# Patient Record
Sex: MALE | Race: WHITE | NOT HISPANIC OR LATINO | Employment: FULL TIME | URBAN - METROPOLITAN AREA
[De-identification: names, ages, dates, MRNs, and addresses within clinical notes are randomized per-mention and may not be internally consistent; named-entity substitution may affect disease eponyms.]

---

## 2017-01-05 ENCOUNTER — ALLSCRIPTS OFFICE VISIT (OUTPATIENT)
Dept: OTHER | Facility: OTHER | Age: 37
End: 2017-01-05

## 2017-01-05 DIAGNOSIS — N18.2 CHRONIC KIDNEY DISEASE, STAGE II (MILD): ICD-10-CM

## 2017-01-05 DIAGNOSIS — Z94.0 HISTORY OF KIDNEY TRANSPLANT: ICD-10-CM

## 2017-01-05 DIAGNOSIS — N39.0 URINARY TRACT INFECTION: ICD-10-CM

## 2017-01-05 DIAGNOSIS — I10 ESSENTIAL (PRIMARY) HYPERTENSION: ICD-10-CM

## 2017-01-05 DIAGNOSIS — R07.81 PLEURODYNIA: ICD-10-CM

## 2017-01-17 ENCOUNTER — ALLSCRIPTS OFFICE VISIT (OUTPATIENT)
Dept: OTHER | Facility: OTHER | Age: 37
End: 2017-01-17

## 2017-01-17 ENCOUNTER — GENERIC CONVERSION - ENCOUNTER (OUTPATIENT)
Dept: OTHER | Facility: OTHER | Age: 37
End: 2017-01-17

## 2017-01-21 ENCOUNTER — OFFICE VISIT (OUTPATIENT)
Dept: URGENT CARE | Age: 37
End: 2017-01-21
Payer: COMMERCIAL

## 2017-01-21 ENCOUNTER — HOSPITAL ENCOUNTER (OUTPATIENT)
Dept: RADIOLOGY | Age: 37
Discharge: HOME | End: 2017-01-21
Attending: PHYSICIAN ASSISTANT | Admitting: FAMILY MEDICINE
Payer: COMMERCIAL

## 2017-01-21 ENCOUNTER — TRANSCRIBE ORDERS (OUTPATIENT)
Dept: URGENT CARE | Age: 37
End: 2017-01-21

## 2017-01-21 DIAGNOSIS — R07.81 PLEURODYNIA: ICD-10-CM

## 2017-01-21 PROCEDURE — 71101 X-RAY EXAM UNILAT RIBS/CHEST: CPT

## 2017-01-21 PROCEDURE — G0382 LEV 3 HOSP TYPE B ED VISIT: HCPCS | Performed by: FAMILY MEDICINE

## 2017-01-21 PROCEDURE — 99283 EMERGENCY DEPT VISIT LOW MDM: CPT | Performed by: FAMILY MEDICINE

## 2017-01-23 ENCOUNTER — ALLSCRIPTS OFFICE VISIT (OUTPATIENT)
Dept: OTHER | Facility: OTHER | Age: 37
End: 2017-01-23

## 2017-01-23 LAB
BILIRUB UR QL STRIP: NORMAL
CLARITY UR: NORMAL
COLOR UR: NORMAL
GLUCOSE (HISTORICAL): NORMAL
HGB UR QL STRIP.AUTO: NORMAL
KETONES UR STRIP-MCNC: NORMAL MG/DL
LEUKOCYTE ESTERASE UR QL STRIP: NORMAL
NITRITE UR QL STRIP: NORMAL
PH UR STRIP.AUTO: 5 [PH]
PROT UR STRIP-MCNC: NORMAL MG/DL
SP GR UR STRIP.AUTO: 1.02
UROBILINOGEN UR QL STRIP.AUTO: 0.2

## 2017-01-24 ENCOUNTER — TRANSCRIBE ORDERS (OUTPATIENT)
Dept: ADMINISTRATIVE | Age: 37
End: 2017-01-24

## 2017-01-24 ENCOUNTER — APPOINTMENT (OUTPATIENT)
Dept: LAB | Age: 37
End: 2017-01-24
Payer: COMMERCIAL

## 2017-01-24 ENCOUNTER — HOSPITAL ENCOUNTER (OUTPATIENT)
Dept: RADIOLOGY | Age: 37
Discharge: HOME | End: 2017-01-24
Payer: COMMERCIAL

## 2017-01-24 DIAGNOSIS — N39.0 URINARY TRACT INFECTION: ICD-10-CM

## 2017-01-24 DIAGNOSIS — N18.2 CHRONIC KIDNEY DISEASE, STAGE II (MILD): Primary | ICD-10-CM

## 2017-01-24 DIAGNOSIS — N18.2 CHRONIC KIDNEY DISEASE, STAGE II (MILD): ICD-10-CM

## 2017-01-24 DIAGNOSIS — Z94.0 HISTORY OF KIDNEY TRANSPLANT: ICD-10-CM

## 2017-01-24 DIAGNOSIS — I10 ESSENTIAL (PRIMARY) HYPERTENSION: ICD-10-CM

## 2017-01-24 LAB
ANION GAP SERPL CALCULATED.3IONS-SCNC: 7 MMOL/L (ref 4–13)
BACTERIA UR QL AUTO: ABNORMAL /HPF
BILIRUB UR QL STRIP: NEGATIVE
BUN SERPL-MCNC: 21 MG/DL (ref 5–25)
C3 SERPL-MCNC: 85.2 MG/DL (ref 90–180)
C4 SERPL-MCNC: 30 MG/DL (ref 10–40)
CALCIUM SERPL-MCNC: 9 MG/DL (ref 8.3–10.1)
CHLORIDE SERPL-SCNC: 104 MMOL/L (ref 100–108)
CLARITY UR: CLEAR
CO2 SERPL-SCNC: 25 MMOL/L (ref 21–32)
COLOR UR: YELLOW
CREAT SERPL-MCNC: 1.53 MG/DL (ref 0.6–1.3)
ERYTHROCYTE [DISTWIDTH] IN BLOOD BY AUTOMATED COUNT: 14.3 % (ref 11.6–15.1)
GFR SERPL CREATININE-BSD FRML MDRD: 51.5 ML/MIN/1.73SQ M
GLUCOSE SERPL-MCNC: 101 MG/DL (ref 65–140)
GLUCOSE UR STRIP-MCNC: NEGATIVE MG/DL
HCT VFR BLD AUTO: 39.8 % (ref 36.5–49.3)
HGB BLD-MCNC: 13.5 G/DL (ref 12–17)
HGB UR QL STRIP.AUTO: ABNORMAL
HYALINE CASTS #/AREA URNS LPF: ABNORMAL /LPF
KETONES UR STRIP-MCNC: NEGATIVE MG/DL
LEUKOCYTE ESTERASE UR QL STRIP: NEGATIVE
MCH RBC QN AUTO: 28.8 PG (ref 26.8–34.3)
MCHC RBC AUTO-ENTMCNC: 33.9 G/DL (ref 31.4–37.4)
MCV RBC AUTO: 85 FL (ref 82–98)
NITRITE UR QL STRIP: NEGATIVE
NON-SQ EPI CELLS URNS QL MICRO: ABNORMAL /HPF
PH UR STRIP.AUTO: 6 [PH] (ref 4.5–8)
PLATELET # BLD AUTO: 288 THOUSANDS/UL (ref 149–390)
PMV BLD AUTO: 10.3 FL (ref 8.9–12.7)
POTASSIUM SERPL-SCNC: 4.1 MMOL/L (ref 3.5–5.3)
PROT UR STRIP-MCNC: ABNORMAL MG/DL
RBC # BLD AUTO: 4.69 MILLION/UL (ref 3.88–5.62)
RBC #/AREA URNS AUTO: ABNORMAL /HPF
SODIUM SERPL-SCNC: 136 MMOL/L (ref 136–145)
SP GR UR STRIP.AUTO: 1.01 (ref 1–1.03)
UROBILINOGEN UR QL STRIP.AUTO: 0.2 E.U./DL
WBC # BLD AUTO: 7.08 THOUSAND/UL (ref 4.31–10.16)
WBC #/AREA URNS AUTO: ABNORMAL /HPF

## 2017-01-24 PROCEDURE — 80197 ASSAY OF TACROLIMUS: CPT

## 2017-01-24 PROCEDURE — 86038 ANTINUCLEAR ANTIBODIES: CPT

## 2017-01-24 PROCEDURE — 80048 BASIC METABOLIC PNL TOTAL CA: CPT

## 2017-01-24 PROCEDURE — 85027 COMPLETE CBC AUTOMATED: CPT

## 2017-01-24 PROCEDURE — 86255 FLUORESCENT ANTIBODY SCREEN: CPT

## 2017-01-24 PROCEDURE — 87086 URINE CULTURE/COLONY COUNT: CPT

## 2017-01-24 PROCEDURE — 81001 URINALYSIS AUTO W/SCOPE: CPT

## 2017-01-24 PROCEDURE — 86160 COMPLEMENT ANTIGEN: CPT

## 2017-01-24 PROCEDURE — 36415 COLL VENOUS BLD VENIPUNCTURE: CPT

## 2017-01-25 ENCOUNTER — HOSPITAL ENCOUNTER (OUTPATIENT)
Dept: NON INVASIVE DIAGNOSTICS | Facility: CLINIC | Age: 37
Discharge: HOME | End: 2017-01-25
Payer: COMMERCIAL

## 2017-01-25 ENCOUNTER — GENERIC CONVERSION - ENCOUNTER (OUTPATIENT)
Dept: OTHER | Facility: OTHER | Age: 37
End: 2017-01-25

## 2017-01-25 DIAGNOSIS — N18.2 CHRONIC KIDNEY DISEASE, STAGE II (MILD): ICD-10-CM

## 2017-01-25 LAB
BACTERIA UR CULT: NORMAL
RYE IGE QN: NEGATIVE

## 2017-01-25 PROCEDURE — 93975 VASCULAR STUDY: CPT

## 2017-01-26 LAB
C-ANCA TITR SER IF: NORMAL TITER
MYELOPEROXIDASE AB SER IA-ACNC: <9 U/ML (ref 0–9)
P-ANCA ATYPICAL TITR SER IF: NORMAL TITER
P-ANCA TITR SER IF: NORMAL TITER
PROTEINASE3 AB SER IA-ACNC: <3.5 U/ML (ref 0–3.5)
TACROLIMUS BLD LC/MS/MS-MCNC: 8.5 NG/ML (ref 2–20)

## 2017-02-03 ENCOUNTER — GENERIC CONVERSION - ENCOUNTER (OUTPATIENT)
Dept: OTHER | Facility: OTHER | Age: 37
End: 2017-02-03

## 2017-02-06 ENCOUNTER — GENERIC CONVERSION - ENCOUNTER (OUTPATIENT)
Dept: OTHER | Facility: OTHER | Age: 37
End: 2017-02-06

## 2017-03-02 ENCOUNTER — GENERIC CONVERSION - ENCOUNTER (OUTPATIENT)
Dept: OTHER | Facility: OTHER | Age: 37
End: 2017-03-02

## 2017-03-03 ENCOUNTER — GENERIC CONVERSION - ENCOUNTER (OUTPATIENT)
Dept: OTHER | Facility: OTHER | Age: 37
End: 2017-03-03

## 2017-03-05 ENCOUNTER — GENERIC CONVERSION - ENCOUNTER (OUTPATIENT)
Dept: OTHER | Facility: OTHER | Age: 37
End: 2017-03-05

## 2017-03-12 ENCOUNTER — GENERIC CONVERSION - ENCOUNTER (OUTPATIENT)
Dept: OTHER | Facility: OTHER | Age: 37
End: 2017-03-12

## 2017-03-20 ENCOUNTER — GENERIC CONVERSION - ENCOUNTER (OUTPATIENT)
Dept: OTHER | Facility: OTHER | Age: 37
End: 2017-03-20

## 2017-03-30 ENCOUNTER — TRANSCRIBE ORDERS (OUTPATIENT)
Dept: ADMINISTRATIVE | Facility: HOSPITAL | Age: 37
End: 2017-03-30

## 2017-03-30 DIAGNOSIS — N04.5 NEPHROTIC SYNDROME WITH LESION OF MEMBRANOPROLIFERATIVE GLOMERULONEPHRITIS: ICD-10-CM

## 2017-03-30 DIAGNOSIS — N05.6 DENSE DEPOSIT DISEASE: Primary | ICD-10-CM

## 2017-03-30 DIAGNOSIS — Z94.0 KIDNEY REPLACED BY TRANSPLANT: ICD-10-CM

## 2017-03-30 DIAGNOSIS — R80.9 PROTEINURIA: ICD-10-CM

## 2017-04-01 DIAGNOSIS — Z94.0 HISTORY OF KIDNEY TRANSPLANT: ICD-10-CM

## 2017-04-03 ENCOUNTER — GENERIC CONVERSION - ENCOUNTER (OUTPATIENT)
Dept: OTHER | Facility: OTHER | Age: 37
End: 2017-04-03

## 2017-05-09 ENCOUNTER — GENERIC CONVERSION - ENCOUNTER (OUTPATIENT)
Dept: OTHER | Facility: OTHER | Age: 37
End: 2017-05-09

## 2017-05-26 ENCOUNTER — ALLSCRIPTS OFFICE VISIT (OUTPATIENT)
Dept: OTHER | Facility: OTHER | Age: 37
End: 2017-05-26

## 2017-07-18 ENCOUNTER — GENERIC CONVERSION - ENCOUNTER (OUTPATIENT)
Dept: OTHER | Facility: OTHER | Age: 37
End: 2017-07-18

## 2017-08-17 ENCOUNTER — ALLSCRIPTS OFFICE VISIT (OUTPATIENT)
Dept: OTHER | Facility: OTHER | Age: 37
End: 2017-08-17

## 2017-09-01 DIAGNOSIS — T86.19 OTHER COMPLICATION OF KIDNEY TRANSPLANT: ICD-10-CM

## 2017-09-01 DIAGNOSIS — E55.9 VITAMIN D DEFICIENCY: ICD-10-CM

## 2017-11-17 ENCOUNTER — ALLSCRIPTS OFFICE VISIT (OUTPATIENT)
Dept: OTHER | Facility: OTHER | Age: 37
End: 2017-11-17

## 2017-12-14 ENCOUNTER — GENERIC CONVERSION - ENCOUNTER (OUTPATIENT)
Dept: OTHER | Facility: OTHER | Age: 37
End: 2017-12-14

## 2017-12-19 ENCOUNTER — GENERIC CONVERSION - ENCOUNTER (OUTPATIENT)
Dept: INTERNAL MEDICINE CLINIC | Facility: CLINIC | Age: 37
End: 2017-12-19

## 2018-01-02 ENCOUNTER — GENERIC CONVERSION - ENCOUNTER (OUTPATIENT)
Dept: INTERNAL MEDICINE CLINIC | Facility: CLINIC | Age: 38
End: 2018-01-02

## 2018-01-02 ENCOUNTER — GENERIC CONVERSION - ENCOUNTER (OUTPATIENT)
Dept: NEPHROLOGY | Facility: CLINIC | Age: 38
End: 2018-01-02

## 2018-01-09 NOTE — MISCELLANEOUS
Message  Pt called stating that his pressure has been elevated (160's / 100) and asked if he can take an extra 300 mg of Labetalol ,per Dr Felix piña to do so just monitor bp's and watch for any dizziness or lightheadedness,pt aware of the above  Active Problems    1  Benign essential hypertension (401 1) (I10)   2  Chronic arthralgias of knees and hips (719 45,719 46)   (M25 551,M25 552,M25 561,M25 562,G89 29)   3  Chronic kidney disease (CKD), stage II (mild) (585 2) (N18 2)   4  Contusion of right chest wall, initial encounter (922 1) (S20 211A)   5  Fever, intermittent (780 60) (R50 9)   6  Herniated lumbar intervertebral disc (722 10) (M51 26)   7  Hyperlipidemia (272 4) (E78 5)   8  Hypomagnesemia (275 2) (E83 42)   9  Hypotension (458 9) (I95 9)   10  Lower back pain (724 2) (M54 5)   11  Microscopic hematuria (599 72) (R31 29)   12  Proteinuria (791 0) (R80 9)   13  Rib pain on right side (786 50) (R07 81)   14  Right buttock pain (729 1) (M79 1)   15  Sciatica, right (724 3) (M54 31)   16  Screening for malignant neoplasm of skin (V76 43) (Z12 83)   17  Status post renal autotransplantation (V42 0) (Z94 0)   18  Urinary tract infection (599 0) (N39 0)   19  Vitamin D deficiency (268 9) (E55 9)    Current Meds   1  Ketorolac Tromethamine 60 MG/2ML Injection Solution; INJECT 60  MG Intramuscular; To Be Done: 72ZWS4865; Status: HOLD FOR - Administration Ordered   2  Labetalol HCl - 300 MG Oral Tablet; take 1 tablet by mouth twice a day; Therapy: 33Uan8484 to (Evaluate:73Xll2253)  Requested for: 26STX6239; Last   Rx:31Mar2016 Ordered   3  Multi Vitamin/Fluoride 1 MG CHEW; CHEW AND SWALLOW 1 TABLET DAILY; Therapy: 02ZOR1193 to (Andre Jama)  Requested for: 22DIR9199; Last   Rx:09Jan2014 Ordered   4  Myfortic 180 MG Oral Tablet Delayed Release (Mycophenolate Sodium); take 3 tablets   bid  Requested for: 25Apr2016; Last Rx:22Apr2016 Ordered   5   NIFEdipine ER Osmotic Release 90 MG Oral Tablet Extended Release 24 Hour; take 1   tablet by mouth once daily; Therapy: 81IXV0878 to (Evaluate:53Irp4320)  Requested for: 88WAK5760; Last   Rx:19Xiv1791 Ordered   6  Omeprazole 20 MG Oral Capsule Delayed Release; take 1 capsule by mouth once daily; Therapy: 54FGQ3084 to (Evaluate:12Hlm2280)  Requested for: 45Fkk4803; Last   Rx:10Doe2198 Ordered   7  Prograf 1 MG Oral Capsule (Tacrolimus); take 3 capsules by mouth twice a day; Therapy: 52LRQ3677 to (Evaluate:02Jun2017)  Requested for: 80OHM2698; Last   Rx:95Dyf5033 Ordered   8  Vitamin D3 2000 UNIT Oral Tablet; Takes 1 daily; Therapy: (Recorded:30Oct2014) to Recorded    Allergies    1   No Known Drug Allergies    Plan  Benign essential hypertension    · Labetalol HCl - 300 MG Oral Tablet; TAKE 1 TABLET 3 times daily    Signatures   Electronically signed by : Clau Peterson, ; Feb  3 2017 11:21AM EST                       (Author)
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
## 2018-01-11 NOTE — MISCELLANEOUS
Message  I called the patient this am and left a message re: I needed to review the labs with the patient  His Cr is 1 6 but I am concerned with regards to increasing proteinuria  I made a tentative jaya for tomorrow in our office with a transplant physician in our practice as this patient  I will try and call the patient later   I left my cell phone number when I spoke left message with patient this am       Signatures   Electronically signed by : Triston Eastman DO; Jan 16 2017  9:17AM EST                       (Author)    Electronically signed by : TERE Haider Mt ; Feb 5 2017  3:48PM EST                       (Author)

## 2018-01-11 NOTE — MISCELLANEOUS
Provider Comments  Provider Comments:   Pt was a n/s  LMOM to call back and make new apt        Signatures   Electronically signed by : TERE Chong ; Apr  3 2017  2:50PM EST                       (Review)

## 2018-01-12 VITALS
DIASTOLIC BLOOD PRESSURE: 88 MMHG | HEIGHT: 72 IN | BODY MASS INDEX: 28.58 KG/M2 | WEIGHT: 211 LBS | SYSTOLIC BLOOD PRESSURE: 132 MMHG

## 2018-01-12 NOTE — MISCELLANEOUS
Message  called patient to review updates from Danisha Ocampo if any  no answer        Signatures   Electronically signed by : TERE Wilkins ; Apr 12 2017  2:47PM EST                       (Author)

## 2018-01-12 NOTE — MISCELLANEOUS
Message  I called the [place] yesterday in followup; [place] of [place] issues  He will fly back as soon as possible but he is not sure when he would be able to catch next flight  We tried to facilitate followup as fast as possible here  He is scheduled to see Dr Mickey Medina in out office on Monday as he is a transplant certified doctor in our practice  I will speak with him as well  Repeat lab work next week        Signatures   Electronically signed by : Leticia Monterroso DO; Jan 17 2017 11:29PM EST                       (Author)
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
## 2018-01-13 VITALS — SYSTOLIC BLOOD PRESSURE: 155 MMHG | DIASTOLIC BLOOD PRESSURE: 100 MMHG

## 2018-01-13 NOTE — MISCELLANEOUS
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, but EM is pending on pathology  Patient also had repeat labwork on Friday that is pending  Is being sent for C3, C4, UPCR, SPEP, UPEP, light chains per [facility] team  Patient also has been started on losartan  - i discussed with patient now that having appointment tomorrow may not benefit patient as we are awaiting further results from [place] and patent has not yet started new medication (losartan); we had increased labetalol to TID dosing last week because of elevated BP > 746 systolic  - for now, will hold appt tomorrow  - will discuss with Primary Nephrologist regarding findings thus far  - will await further results from [facility] which should hopefully return today or tomorrow  - Team will call Pharmacy to determine why losartan is not being released to patient (holding lisinopril)  - patient may need further eval with C3 nephritic factor, sMAC, family screening  - patient will require meningococcal vaccine (Inactived) prior to consideration for eculizumab at least 2 weeks prior  - will need BMP in 2 weeks after starting losartan  - Will cc Dr Timothy Rivera to this note to also make aware, but will also call and discuss     - patient will need f/u appt      Signatures   Electronically signed by : TERE Pena ; Feb 6 2017  3:57PM EST                       (Author)

## 2018-01-13 NOTE — MISCELLANEOUS
Provider Comments  Provider Comments:   Pt was N/S  Called [facility] to call back and reschedule      Signatures   Electronically signed by : [facility]; Aug 12 2016  3:08PM EST                       (Author)    Electronically signed by : [facility] Hospital[facility]lif;  Aug 12 2016  3:08PM EST                       (Author)

## 2018-01-13 NOTE — RESULT NOTES
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

## 2018-01-13 NOTE — MISCELLANEOUS
Message   Recorded as Task   Date: 04/06/2016 12:44 PM, Created By: Romulo Apodaca   Task Name: Medical Complaint Callback   Assigned To: Clarisse Liner   Regarding Patient: Hal Presley, Status: In Progress   Comment:    Stacy Hsu - 06 Apr 2016 12:44 PM     TASK CREATED  Jesse Emperor from [place] [address] [facility] is calling to request a physician referral because the patient is having a disc replaced on lower back on 4/20/16     please fax: [phone]  phone: [phone]   Clarisse Liner - 06 Apr 2016 1:10 PM     TASK REASSIGNED: Previously Assigned To Clarisse Liner  #1  Please return a phone call to Jesse Jimenes at [place] [address] neurosurgery  #2  I need the name of a specific surgeon to issue the referral    [facility] - 07 Apr 2016 2:06 PM     TASK IN PROGRESS   Josepha Ocean - 07 Apr 2016 2:10 PM     TASK EDITED  [place] Emperor said it will be Dr Spike Adams  Clarisse Liner - 08 Apr 2016 8:48 AM     TASK EDITED  CTN        Plan  Herniated lumbar intervertebral disc, Lower back pain    · 3 Sharad Schumacher MD, Balaji Platt  (Neurosurgery) Physician Referral  Consult  Status: Hold For -  Scheduling  Requested for: 08Apr2016  are Referring to a non- Preferred Provider : Established Patient  Care Summary provided  : Yes    Signatures   Electronically signed by :  Suyapa Calixto MD; Apr 8 2016  8:50AM EST                       (Author)

## 2018-01-14 VITALS — HEIGHT: 72 IN | WEIGHT: 209 LBS | BODY MASS INDEX: 28.31 KG/M2

## 2018-01-15 VITALS
HEIGHT: 72 IN | WEIGHT: 213 LBS | DIASTOLIC BLOOD PRESSURE: 90 MMHG | BODY MASS INDEX: 28.85 KG/M2 | SYSTOLIC BLOOD PRESSURE: 152 MMHG

## 2018-01-15 VITALS — DIASTOLIC BLOOD PRESSURE: 88 MMHG | SYSTOLIC BLOOD PRESSURE: 126 MMHG

## 2018-01-15 NOTE — MISCELLANEOUS
Message  I called the patient this am and left a message re: I needed to review the labs with the patient  His Cr is 1 6 but I am concerned with regards to increasing proteinuria  I made a tentative jaya for tomorrow in our office with a transplant physician in our practice as this patient  I will try and call the patient later   I left my cell phone number when I spoke left message with patient this am       Signatures   Electronically signed by : Lili Friday DO; Jan 16 2017  9:19AM EST                       (Author)

## 2018-01-15 NOTE — MISCELLANEOUS
Provider Comments  Provider Comments:   Pt no showed for appt 11/17/2017 /dl      Signatures   Electronically signed by : Suzy Edwards DO; Nov 17 2017 12:56PM EST                       (Author)

## 2018-01-15 NOTE — MISCELLANEOUS
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  has appt with [facility] tomorrow  I will follow up with patient after his appt tomorrow over the phone, and also, will touch base with Dr Radha Busch at [facility] after he has had a chance to see patient tomorrow regarding treatment options  patient is due for labs next week, but may have sooner per Dr Radha Busch  C3GN can be aggressive when recurs in transplanted kidney and this was explained to the patient  consideration for eculizumab in progress        Signatures   Electronically signed by : TERE Young ; Mar  2 2017  3:37PM EST                       (Author)

## 2018-01-16 NOTE — MISCELLANEOUS
Message  per Dr Chago Agudelo, ok to take Lyrica for herniated disc but only 1-OD  Active Problems    1  Benign essential hypertension (401 1) (I10)   2  Chronic arthralgias of knees and hips (719 45,719 46)   (M25 551,M25 552,M25 561,M25 562,G89 29)   3  Chronic kidney disease (CKD), stage II (mild) (585 2) (N18 2)   4  Hyperlipidemia (272 4) (E78 5)   5  Hypomagnesemia (275 2) (E83 42)   6  Hypotension (458 9) (I95 9)   7  Living unrelated donor renal transplant (V42 0) (Z94 0)   8  Microscopic hematuria (599 72) (R31 2)   9  Proteinuria (791 0) (R80 9)   10  Right buttock pain (729 1) (M79 1)   11  Sciatica, right (724 3) (M54 31)   12  Screening for malignant neoplasm of skin (V76 43) (Z12 83)   13  Status post renal autotransplantation (V42 0) (Z94 0)   14  Urinary tract infection (599 0) (N39 0)   15  Vitamin D deficiency (268 9) (E55 9)    Current Meds   1  Cyclobenzaprine HCl - 10 MG Oral Tablet; TAKE 1 TABLET 3 TIMES DAILY AS NEEDED; Therapy: 05SUS0237 to (Evaluate:28Mar2016)  Requested for: 21Mar2016; Last   Rx:21Mar2016 Ordered   2  Ketorolac Tromethamine 60 MG/2ML Injection Solution; INJECT 60  MG Intramuscular; To Be Done: 34LOR8748; Status: HOLD FOR - Administration Ordered   3  Labetalol HCl - 300 MG Oral Tablet; take 1 tablet by mouth twice a day; Therapy: 09Apr2015 to 482 1818)  Requested for: 09Apr2015; Last   Rx:09Apr2015 Ordered   4  Lisinopril 2 5 MG Oral Tablet; take 1 tablet by mouth once daily; Therapy: 16QBL6255 to (Aram Whiting)  Requested for: 08Sep2015; Last   Rx:08Sep2015 Ordered   5  MethylPREDNISolone (Bryan) 4 MG Oral Tablet (Medrol (Bryan)); TAKE AS DIRECTED ON   PATIENT INSTRUCTION CARD; Therapy: 08ZCH5737 to (Last Rx:21Mar2016)  Requested for: 21Mar2016 Ordered   6  Multi Vitamin/Fluoride 1 MG Oral Tablet Chewable; CHEW AND SWALLOW 1 TABLET   DAILY; Therapy: 55ICB2540 to (Hodan Cheese)  Requested for: 26NGV3445; Last   Rx:09Jan2014 Ordered   7  MVC-Fluoride 1 MG Oral Tablet Chewable; chew and swallow 1 tablet by mouth once   daily; Therapy: 73COP1896 to (Mariana Ghanshyam)  Requested for: 93TOC7196; Last   NX:51DOU2191 Ordered   8  Myfortic 180 MG Oral Tablet Delayed Release (Mycophenolic Acid); take 3 tablets bid    Requested for: 81LXY9974; Last SQ:18CHK7798 Ordered   9  NIFEdipine ER Osmotic Release 90 MG Oral Tablet Extended Release 24 Hour; take 1   tablet by mouth once daily; Therapy: 05DOV3986 to (Evaluate:01Jun2016)  Requested for: 67TOT7438; Last   Rx:04Nov2015 Ordered   10  Omeprazole 20 MG Oral Capsule Delayed Release; TAKE 1 CAPSULE DAILY; Therapy: 91AVX0017 to (Louvella Sins)  Requested for: 16YGK2275; Last    Rx:04Jun2015 Ordered   11  Prograf 1 MG Oral Capsule (Tacrolimus); take 3 capsules by mouth twice a day; Therapy: 95EMA3482 to (Evaluate:66Klb5042)  Requested for: 26WHN2319; Last    Rx:82Fck6303 Ordered   12  Vitamin D3 2000 UNIT Oral Tablet; Takes 1 daily; Therapy: (Recorded:30Oct2014) to Recorded    Allergies    1   No Known Drug Allergies    Signatures   Electronically signed by : Oliver Bowie, ; Mar 30 2016  3:52PM EST                       (Author)

## 2018-01-17 NOTE — MISCELLANEOUS
Message  called patient to discuss follow up for appt this afternoon patient had with [facility]  no answer  left vm        Signatures   Electronically signed by : TERE Jurado ; Mar  3 2017 10:38PM EST                       (Author)

## 2018-01-23 NOTE — MISCELLANEOUS
Message  The patient had some recent lab work done and show a increased creatinine and increased UPC  Per Fred BAHENA they need to call transplant office  I spoke with the patient's wife and she states that she can't get a hold of anyone  I told her that I will call [facility] and talk with someone  I spoke with Dr Nic Jaime and he states that he will handle it and call the patient's wife  Margo Mckeon      Active Problems    1  Benign essential hypertension (401 1) (I10)   2  Chronic arthralgias of knees and hips (719 45,719 46)   (M25 551,M25 552,M25 561,M25 562,G89 29)   3  Chronic kidney disease (CKD), stage II (mild) (585 2) (N18 2)   4  Contusion of right chest wall, initial encounter (922 1) (S20 211A)   5  Fever, intermittent (780 60) (R50 9)   6  Herniated lumbar intervertebral disc (722 10) (M51 26)   7  Hyperlipidemia (272 4) (E78 5)   8  Hypomagnesemia (275 2) (E83 42)   9  Hypotension (458 9) (I95 9)   10  Living unrelated donor renal transplant (V42 0) (Z94 0)   11  Lower back pain (724 2) (M54 5)   12  Microscopic hematuria (599 72) (R31 29)   13  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,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,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)   14  MPGN of transplanted kidney (295 37,693 6) (T86 19,N05 5)   15  Need for Menactra vaccination (V03 89) (Z23)   16  Need for meningococcal vaccination (V03 89) (Z23)   17  Proteinuria (791 0) (R80 9)   18  Rib pain on right side (786 50) (R07 81)   19  Right buttock pain (729 1) (M79 1)   20  Sciatica, right (724 3) (M54 31)   21  Screening for malignant neoplasm of skin (V76 43) (Z12 83)   22  Status post renal autotransplantation (V42 0) (Z94 0)   23  Urinary tract infection (599 0) (N39 0)   24  Vitamin D deficiency (268 9) (E55 9)    Current Meds   1  Ketorolac Tromethamine 60 MG/2ML Injection Solution; INJECT 60  MG Intramuscular; To Be Done: 28FXE4282; Status: HOLD FOR - Administration Ordered   2  Labetalol HCl - 300 MG Oral Tablet; TAKE 1 TABLET 3 times daily;    Therapy: 84LCN4611 to (TFPLIWXW:31LYJ9917)  Requested for: 47Kpv2982; Last   Rx:78Tql3837 Ordered   3  Losartan Potassium 50 MG Oral Tablet; Take 1 tablet daily; Therapy: 38ZBG2776 to Recorded   4  Multi Vitamin/Fluoride 1 MG CHEW; CHEW AND SWALLOW 1 TABLET DAILY; Therapy: 44FIB6553 to (Ced Castro)  Requested for: 89VUW9296; Last   Rx:09Jan2014 Ordered   5  MVC-Fluoride 1 MG Oral Tablet Chewable; CHEW AND SWALLOW 1 TABLET DAILY; Therapy: 24FAW3341 to (Evaluate:17Mar2018)  Requested for: 16Uzl4281; Last   Rx:47Ahz9557 Ordered   6  Myfortic 180 MG Oral Tablet Delayed Release; take 3 tablets bid  Requested for:   65ASU0915; Last Rx:62Kxw0762 Ordered   7  NIFEdipine ER Osmotic Release 90 MG Oral Tablet Extended Release 24 Hour; take 1   tablet by mouth once daily; Therapy: 67SGZ6046 to (Evaluate:14Jan2018)  Requested for: 68KGG7293; Last   Rx:10Mql3916 Ordered   8  Omeprazole 20 MG Oral Capsule Delayed Release; take 1 capsule by mouth once daily; Therapy: 48PPC4220 to (Evaluate:14Apr2018)  Requested for: 62PFY5952; Last   Rx:92Enw7506 Ordered   9  Prograf 1 MG Oral Capsule; take 3 capsules by mouth twice a day; Therapy: 93YYM5422 to (Evaluate:61Qrw7959)  Requested for: 20KPT5562; Last   Rx:43Iey6080 Ordered   10  Vitamin D3 2000 UNIT Oral Tablet; Takes 1 daily; Therapy: (Recorded:30Oct2014) to Recorded    Allergies    1   No Known Drug Allergies    Signatures   Electronically signed by : Dipesh Genao DO; Dec 18 2017 11:23AM EST                       (Acknowledgement)

## 2018-02-13 DIAGNOSIS — I10 BENIGN HYPERTENSION: Primary | ICD-10-CM

## 2018-02-13 RX ORDER — NIFEDIPINE 90 MG/1
TABLET, EXTENDED RELEASE ORAL
Qty: 30 TABLET | Refills: 6 | Status: SHIPPED | OUTPATIENT
Start: 2018-02-13 | End: 2022-05-06

## 2018-03-13 DIAGNOSIS — I10 ESSENTIAL HYPERTENSION, BENIGN: Primary | ICD-10-CM

## 2018-03-14 RX ORDER — SODIUM FLUORIDE, VITAMIN A, ASCORBIC ACID, VITAMIN D, ALPHA-TOCOPHEROL, THIAMINE, RIBOFLAVIN, NIACIN, PYRIDOXINE, FOLIC ACID, AND CYANOCOBALAMIN 1; 2500; 60; 400; 15; 1.05; 1.2; 13.5; 1.05; .3; 4.5 MG/1; [IU]/1; MG/1; [IU]/1; [IU]/1; MG/1; MG/1; MG/1; MG/1; MG/1; UG/1
TABLET, CHEWABLE ORAL
Qty: 30 TABLET | Refills: 5 | Status: SHIPPED | OUTPATIENT
Start: 2018-03-14 | End: 2018-08-16 | Stop reason: SDUPTHER

## 2018-03-27 DIAGNOSIS — N18.30 CKD (CHRONIC KIDNEY DISEASE) STAGE 3, GFR 30-59 ML/MIN (HCC): Primary | ICD-10-CM

## 2018-03-28 RX ORDER — OMEPRAZOLE 20 MG/1
CAPSULE, DELAYED RELEASE ORAL
Qty: 30 CAPSULE | Refills: 5 | Status: SHIPPED | OUTPATIENT
Start: 2018-03-28 | End: 2018-10-07 | Stop reason: SDUPTHER

## 2018-04-18 DIAGNOSIS — I10 ESSENTIAL HYPERTENSION, MALIGNANT: Primary | ICD-10-CM

## 2018-04-19 RX ORDER — LABETALOL 300 MG/1
TABLET, FILM COATED ORAL
Qty: 90 TABLET | Refills: 3 | Status: SHIPPED | OUTPATIENT
Start: 2018-04-19 | End: 2018-08-16 | Stop reason: SDUPTHER

## 2018-05-11 DIAGNOSIS — T86.10 KIDNEY TRANSPLANT AS CAUSE OF ABNORMAL REACTION OR LATER COMPLICATION: Primary | ICD-10-CM

## 2018-05-11 RX ORDER — TACROLIMUS 1 MG/1
CAPSULE, GELATIN COATED ORAL
Qty: 180 CAPSULE | Refills: 6 | Status: SHIPPED | OUTPATIENT
Start: 2018-05-11 | End: 2022-05-06

## 2018-06-26 ENCOUNTER — TELEPHONE (OUTPATIENT)
Dept: NEPHROLOGY | Facility: CLINIC | Age: 38
End: 2018-06-26

## 2018-06-26 NOTE — TELEPHONE ENCOUNTER
A Messaged was sent over through Answering service to please fax preauthorized medication insurance  Please contact caller because no other information was given

## 2018-08-16 DIAGNOSIS — I10 ESSENTIAL HYPERTENSION, BENIGN: ICD-10-CM

## 2018-08-16 DIAGNOSIS — I10 ESSENTIAL HYPERTENSION, MALIGNANT: ICD-10-CM

## 2018-08-16 RX ORDER — LABETALOL 300 MG/1
TABLET, FILM COATED ORAL
Qty: 90 TABLET | Refills: 3 | Status: SHIPPED | OUTPATIENT
Start: 2018-08-16 | End: 2019-04-04 | Stop reason: SDUPTHER

## 2018-08-16 RX ORDER — SODIUM FLUORIDE, VITAMIN A, ASCORBIC ACID, VITAMIN D, ALPHA-TOCOPHEROL, THIAMINE, RIBOFLAVIN, NIACIN, PYRIDOXINE, FOLIC ACID, AND CYANOCOBALAMIN 1; 2500; 60; 400; 15; 1.05; 1.2; 13.5; 1.05; .3; 4.5 MG/1; [IU]/1; MG/1; [IU]/1; [IU]/1; MG/1; MG/1; MG/1; MG/1; MG/1; UG/1
TABLET, CHEWABLE ORAL
Qty: 30 TABLET | Refills: 5 | Status: SHIPPED | OUTPATIENT
Start: 2018-08-16 | End: 2019-08-12 | Stop reason: SDUPTHER

## 2018-10-07 DIAGNOSIS — N18.30 CKD (CHRONIC KIDNEY DISEASE) STAGE 3, GFR 30-59 ML/MIN (HCC): ICD-10-CM

## 2018-10-08 RX ORDER — OMEPRAZOLE 20 MG/1
CAPSULE, DELAYED RELEASE ORAL
Qty: 30 CAPSULE | Refills: 5 | Status: SHIPPED | OUTPATIENT
Start: 2018-10-08 | End: 2019-04-10 | Stop reason: SDUPTHER

## 2019-02-11 ENCOUNTER — LAB REQUISITION (OUTPATIENT)
Dept: LAB | Facility: HOSPITAL | Age: 39
End: 2019-02-11

## 2019-02-11 DIAGNOSIS — E87.8 OTHER DISORDERS OF ELECTROLYTE AND FLUID BALANCE, NOT ELSEWHERE CLASSIFIED: ICD-10-CM

## 2019-02-11 LAB — POTASSIUM SERPL-SCNC: 7.9 MMOL/L (ref 3.5–5.3)

## 2019-02-11 PROCEDURE — 84132 ASSAY OF SERUM POTASSIUM: CPT | Performed by: INTERNAL MEDICINE

## 2019-02-12 ENCOUNTER — LAB REQUISITION (OUTPATIENT)
Dept: LAB | Facility: HOSPITAL | Age: 39
End: 2019-02-12

## 2019-02-12 DIAGNOSIS — E87.8 OTHER DISORDERS OF ELECTROLYTE AND FLUID BALANCE, NOT ELSEWHERE CLASSIFIED: ICD-10-CM

## 2019-02-12 LAB — POTASSIUM SERPL-SCNC: 5.6 MMOL/L (ref 3.5–5.3)

## 2019-02-12 PROCEDURE — 84132 ASSAY OF SERUM POTASSIUM: CPT | Performed by: INTERNAL MEDICINE

## 2019-02-18 ENCOUNTER — LAB REQUISITION (OUTPATIENT)
Dept: LAB | Facility: HOSPITAL | Age: 39
End: 2019-02-18

## 2019-02-18 DIAGNOSIS — E87.8 OTHER DISORDERS OF ELECTROLYTE AND FLUID BALANCE, NOT ELSEWHERE CLASSIFIED: ICD-10-CM

## 2019-02-18 LAB — POTASSIUM SERPL-SCNC: 6.2 MMOL/L (ref 3.5–5.3)

## 2019-02-18 PROCEDURE — 84132 ASSAY OF SERUM POTASSIUM: CPT | Performed by: INTERNAL MEDICINE

## 2019-04-04 DIAGNOSIS — I10 ESSENTIAL HYPERTENSION, MALIGNANT: ICD-10-CM

## 2019-04-04 RX ORDER — LABETALOL 300 MG/1
TABLET, FILM COATED ORAL
Qty: 90 TABLET | Refills: 3 | Status: SHIPPED | OUTPATIENT
Start: 2019-04-04 | End: 2019-08-12 | Stop reason: SDUPTHER

## 2019-04-10 DIAGNOSIS — N18.30 CKD (CHRONIC KIDNEY DISEASE) STAGE 3, GFR 30-59 ML/MIN (HCC): ICD-10-CM

## 2019-04-10 RX ORDER — OMEPRAZOLE 20 MG/1
CAPSULE, DELAYED RELEASE ORAL
Qty: 30 CAPSULE | Refills: 5 | Status: SHIPPED | OUTPATIENT
Start: 2019-04-10 | End: 2019-11-15 | Stop reason: SDUPTHER

## 2019-05-15 ENCOUNTER — LAB REQUISITION (OUTPATIENT)
Dept: LAB | Facility: HOSPITAL | Age: 39
End: 2019-05-15

## 2019-05-15 DIAGNOSIS — E87.8 OTHER DISORDERS OF ELECTROLYTE AND FLUID BALANCE, NOT ELSEWHERE CLASSIFIED: ICD-10-CM

## 2019-05-15 LAB — POTASSIUM SERPL-SCNC: 5.3 MMOL/L (ref 3.5–5.3)

## 2019-05-15 PROCEDURE — 84132 ASSAY OF SERUM POTASSIUM: CPT | Performed by: PHYSICIAN ASSISTANT

## 2019-06-05 ENCOUNTER — LAB REQUISITION (OUTPATIENT)
Dept: LAB | Facility: HOSPITAL | Age: 39
End: 2019-06-05

## 2019-06-05 DIAGNOSIS — E87.8 OTHER DISORDERS OF ELECTROLYTE AND FLUID BALANCE, NOT ELSEWHERE CLASSIFIED: ICD-10-CM

## 2019-06-05 LAB — POTASSIUM SERPL-SCNC: 4 MMOL/L (ref 3.5–5.3)

## 2019-06-05 PROCEDURE — 84132 ASSAY OF SERUM POTASSIUM: CPT | Performed by: PHYSICIAN ASSISTANT

## 2019-08-12 DIAGNOSIS — I10 ESSENTIAL HYPERTENSION, BENIGN: ICD-10-CM

## 2019-08-12 DIAGNOSIS — I10 ESSENTIAL HYPERTENSION, MALIGNANT: ICD-10-CM

## 2019-08-12 RX ORDER — SODIUM FLUORIDE, VITAMIN A, ASCORBIC ACID, VITAMIN D, ALPHA-TOCOPHEROL, THIAMINE, RIBOFLAVIN, NIACIN, PYRIDOXINE, FOLIC ACID, AND CYANOCOBALAMIN 1; 2500; 60; 400; 15; 1.05; 1.2; 13.5; 1.05; .3; 4.5 MG/1; [IU]/1; MG/1; [IU]/1; [IU]/1; MG/1; MG/1; MG/1; MG/1; MG/1; UG/1
TABLET, CHEWABLE ORAL
Qty: 30 TABLET | Refills: 5 | Status: SHIPPED | OUTPATIENT
Start: 2019-08-12 | End: 2022-05-06

## 2019-08-12 RX ORDER — LABETALOL 300 MG/1
TABLET, FILM COATED ORAL
Qty: 90 TABLET | Refills: 3 | Status: SHIPPED | OUTPATIENT
Start: 2019-08-12

## 2019-09-03 DIAGNOSIS — I10 ESSENTIAL HYPERTENSION, BENIGN: ICD-10-CM

## 2019-09-03 RX ORDER — SODIUM FLUORIDE, VITAMIN A, ASCORBIC ACID, VITAMIN D, ALPHA-TOCOPHEROL, THIAMINE, RIBOFLAVIN, NIACIN, PYRIDOXINE, FOLIC ACID, AND CYANOCOBALAMIN 1; 2500; 60; 400; 15; 1.05; 1.2; 13.5; 1.05; .3; 4.5 MG/1; [IU]/1; MG/1; [IU]/1; [IU]/1; MG/1; MG/1; MG/1; MG/1; MG/1; UG/1
TABLET, CHEWABLE ORAL
Qty: 30 TABLET | Refills: 5 | Status: SHIPPED | OUTPATIENT
Start: 2019-09-03 | End: 2020-06-05

## 2019-10-23 PROCEDURE — 84132 ASSAY OF SERUM POTASSIUM: CPT | Performed by: INTERNAL MEDICINE

## 2019-10-24 ENCOUNTER — LAB REQUISITION (OUTPATIENT)
Dept: LAB | Facility: HOSPITAL | Age: 39
End: 2019-10-24

## 2019-10-24 DIAGNOSIS — E87.5 HYPERKALEMIA: ICD-10-CM

## 2019-10-24 LAB — POTASSIUM SERPL-SCNC: 5.3 MMOL/L (ref 3.5–5.3)

## 2019-11-15 DIAGNOSIS — N18.30 CKD (CHRONIC KIDNEY DISEASE) STAGE 3, GFR 30-59 ML/MIN (HCC): ICD-10-CM

## 2019-11-18 RX ORDER — OMEPRAZOLE 20 MG/1
CAPSULE, DELAYED RELEASE ORAL
Qty: 30 CAPSULE | Refills: 5 | Status: SHIPPED | OUTPATIENT
Start: 2019-11-18 | End: 2020-05-28

## 2020-05-28 DIAGNOSIS — N18.30 CKD (CHRONIC KIDNEY DISEASE) STAGE 3, GFR 30-59 ML/MIN (HCC): ICD-10-CM

## 2020-05-28 RX ORDER — OMEPRAZOLE 20 MG/1
CAPSULE, DELAYED RELEASE ORAL
Qty: 30 CAPSULE | Refills: 5 | Status: SHIPPED | OUTPATIENT
Start: 2020-05-28 | End: 2020-11-30

## 2020-06-04 DIAGNOSIS — I10 ESSENTIAL HYPERTENSION, BENIGN: ICD-10-CM

## 2020-06-05 RX ORDER — SODIUM FLUORIDE, VITAMIN A, ASCORBIC ACID, VITAMIN D, ALPHA-TOCOPHEROL, THIAMINE, RIBOFLAVIN, NIACIN, PYRIDOXINE, FOLIC ACID, AND CYANOCOBALAMIN 1; 2500; 60; 400; 15; 1.05; 1.2; 13.5; 1.05; .3; 4.5 MG/1; [IU]/1; MG/1; [IU]/1; [IU]/1; MG/1; MG/1; MG/1; MG/1; MG/1; UG/1
TABLET, CHEWABLE ORAL
Qty: 30 TABLET | Refills: 5 | Status: SHIPPED | OUTPATIENT
Start: 2020-06-05 | End: 2022-05-06

## 2020-11-28 DIAGNOSIS — N18.30 CKD (CHRONIC KIDNEY DISEASE) STAGE 3, GFR 30-59 ML/MIN (HCC): ICD-10-CM

## 2020-11-30 RX ORDER — OMEPRAZOLE 20 MG/1
CAPSULE, DELAYED RELEASE ORAL
Qty: 30 CAPSULE | Refills: 5 | Status: SHIPPED | OUTPATIENT
Start: 2020-11-30

## 2022-03-29 LAB — EXTERNAL HIV SCREEN: NORMAL

## 2022-04-06 LAB — HCV AB SER-ACNC: NEGATIVE

## 2022-05-04 ENCOUNTER — TELEPHONE (OUTPATIENT)
Dept: ADMINISTRATIVE | Facility: OTHER | Age: 42
End: 2022-05-04

## 2022-05-04 NOTE — TELEPHONE ENCOUNTER
Upon review of the In Basket request we were able to locate, review, and update the patient chart as requested for Hepatitis C  and HIV  Any additional questions or concerns should be emailed to the Practice Liaisons via [email or web address] email, please do not reply via In Basket      Thank you  Gerardo Mendoza

## 2022-05-04 NOTE — TELEPHONE ENCOUNTER
----- Message from Ubaldo Shearer sent at 5/4/2022  8:33 AM EDT -----  Regarding: care gap request Hep c  05/04/22 8:33 AM    Hello, our patient Christal Morgan has had Hepatitis C completed/performed  Please assist in updating the patient chart by pulling the Care Everywhere (CE) document  The date of service is 04/06/2022,06/17/2020,12/11/2019,07/15/2020,12/16/2020       Thank you,  Ubaldo Shearer  [phone]

## 2022-05-04 NOTE — TELEPHONE ENCOUNTER
----- Message from Zeb Kerns sent at 5/4/2022  8:39 AM EDT -----  Regarding: care gap request HIV  05/04/22 8:40 AM    Hello, our patient Tracy Cantrell has had HIV completed/performed  Please assist in updating the patient chart by pulling the Care Everywhere (CE) document  The date of service is 02/15/22,10/27/2020,11/19/2019       Thank you,  Zeb Kerns  [phone]

## 2022-05-06 ENCOUNTER — OFFICE VISIT (OUTPATIENT)
Dept: FAMILY MEDICINE CLINIC | Facility: CLINIC | Age: 42
End: 2022-05-06
Payer: MEDICARE

## 2022-05-06 VITALS
HEART RATE: 96 BPM | DIASTOLIC BLOOD PRESSURE: 87 MMHG | SYSTOLIC BLOOD PRESSURE: 142 MMHG | BODY MASS INDEX: 29.91 KG/M2 | HEIGHT: 72 IN | TEMPERATURE: 97.6 F | WEIGHT: 220.8 LBS | RESPIRATION RATE: 16 BRPM | OXYGEN SATURATION: 99 %

## 2022-05-06 DIAGNOSIS — I10 BENIGN HYPERTENSION: Primary | ICD-10-CM

## 2022-05-06 DIAGNOSIS — R09.81 STUFFY NOSE: ICD-10-CM

## 2022-05-06 DIAGNOSIS — R06.7 SNEEZING: ICD-10-CM

## 2022-05-06 DIAGNOSIS — E66.3 OVERWEIGHT (BMI 25.0-29.9): ICD-10-CM

## 2022-05-06 PROCEDURE — 99203 OFFICE O/P NEW LOW 30 MIN: CPT | Performed by: FAMILY MEDICINE

## 2022-05-06 RX ORDER — NIFEDIPINE 30 MG/1
30 TABLET, EXTENDED RELEASE ORAL DAILY
Qty: 30 TABLET | Refills: 5 | Status: SHIPPED | OUTPATIENT
Start: 2022-05-06

## 2022-05-06 RX ORDER — CINACALCET 30 MG/1
30 TABLET, FILM COATED ORAL DAILY
COMMUNITY
Start: 2022-04-22

## 2022-05-06 RX ORDER — SODIUM BICARBONATE 650 MG/1
1300 TABLET ORAL 3 TIMES DAILY
COMMUNITY
Start: 2022-04-28

## 2022-05-06 RX ORDER — TACROLIMUS 4 MG/1
TABLET, EXTENDED RELEASE ORAL
COMMUNITY
Start: 2022-04-26

## 2022-05-06 RX ORDER — ACETAMINOPHEN 325 MG/1
650 TABLET ORAL
COMMUNITY
Start: 2022-03-31

## 2022-05-06 RX ORDER — VALGANCICLOVIR 450 MG/1
TABLET, FILM COATED ORAL
COMMUNITY
Start: 2022-04-06

## 2022-05-06 RX ORDER — SULFAMETHOXAZOLE AND TRIMETHOPRIM 400; 80 MG/1; MG/1
TABLET ORAL
COMMUNITY
Start: 2022-04-22

## 2022-05-06 RX ORDER — ASPIRIN 81 MG/1
TABLET ORAL
COMMUNITY
Start: 2022-03-29

## 2022-05-06 RX ORDER — PREDNISONE 1 MG/1
10 TABLET ORAL DAILY
COMMUNITY
Start: 2022-04-26

## 2022-05-06 RX ORDER — TACROLIMUS 1 MG/1
TABLET, EXTENDED RELEASE ORAL
COMMUNITY
Start: 2022-04-26

## 2022-05-06 RX ORDER — MYCOPHENOLATE MOFETIL 250 MG/1
CAPSULE ORAL
COMMUNITY
Start: 2022-04-29

## 2022-05-06 NOTE — PROGRESS NOTES
Assessment and Plan:     Problem List Items Addressed This Visit     None           Preventive health issues were discussed with patient, and age appropriate screening tests were ordered as noted in patient's After Visit Summary  Personalized health advice and appropriate referrals for health education or preventive services given if needed, as noted in patient's After Visit Summary  History of Present Illness:     Patient presents for Welcome to Medicare visit  Patient Care Team:  Kristi Romero DO     Review of Systems:     Review of Systems   Problem List:     There is no problem list on file for this patient  Past Medical and Surgical History:     No past medical history on file  No past surgical history on file  Family History:     No family history on file     Social History:     Social History     Socioeconomic History    Marital status: /Civil Union     Spouse name: Not on file    Number of children: Not on file    Years of education: Not on file    Highest education level: Not on file   Occupational History    Not on file   Tobacco Use    Smoking status: Not on file    Smokeless tobacco: Not on file   Substance and Sexual Activity    Alcohol use: Not on file    Drug use: Not on file    Sexual activity: Not on file   Other Topics Concern    Not on file   Social History Narrative    Not on file     Social Determinants of Health     Financial Resource Strain: Not on file   Food Insecurity: Not on file   Transportation Needs: Not on file   Physical Activity: Not on file   Stress: Not on file   Social Connections: Not on file   Intimate Partner Violence: Not on file   Housing Stability: Not on file      Medications and Allergies:     Current Outpatient Medications   Medication Sig Dispense Refill    labetalol (NORMODYNE) 300 mg tablet take 1 tablet by mouth three times a day 90 tablet 3    NIFEdipine (PROCARDIA XL) 90 mg 24 hr tablet take 1 tablet by mouth once daily 30 tablet 6    omeprazole (PriLOSEC) 20 mg delayed release capsule take 1 capsule by mouth once daily 30 capsule 5    Pediatric Multivitamins-Fl (MVC-FLUORIDE) 1 MG CHEW chew and swallow 1 tablet by mouth once daily 30 tablet 5    Pediatric Multivitamins-Fl (MVC-FLUORIDE) 1 MG CHEW chew and swallow 1 tablet by mouth once daily 30 tablet 5    PROGRAF 1 MG capsule take 3 capsules by mouth twice a day 180 capsule 6     No current facility-administered medications for this visit  Not on File   Immunizations:     Immunization History   Administered Date(s) Administered    Meningococcal B, Recombinant ([place]) 03/22/2017    Meningococcal, Unknown Serogroups 02/06/2017      Health Maintenance:         Topic Date Due    HIV Screening  Completed    Hepatitis C Screening  Completed         Topic Date Due    COVID-19 Vaccine (1) Never done      Medicare Screening Tests and Risk Assessments:     Ozzy Tom is here for his Welcome to Medicare visit  Health Risk Assessment:   Patient rates overall health as very good  Patient feels that their physical health rating is much better  Patient is very satisfied with their life  Eyesight was rated as slightly worse  Hearing was rated as same  Patient feels that their emotional and mental health rating is same  Patients states they are never, rarely angry  Patient states they are never, rarely unusually tired/fatigued  Pain experienced in the last 7 days has been none  Patient states that he has experienced no weight loss or gain in last 6 months  Depression Screening:   PHQ-2 Score: 0      Fall Risk Screening: In the past year, patient has experienced: no history of falling in past year      Home Safety:  Patient has trouble with stairs inside or outside of their home  Patient has working smoke alarms and has working carbon monoxide detector  Home safety hazards include: none  Nutrition:   Current diet is Regular       Medications:   Patient is currently taking over-the-counter supplements  OTC medications include: see medication list  Patient is able to manage medications  Activities of Daily Living (ADLs)/Instrumental Activities of Daily Living (IADLs):   Walk and transfer into and out of bed and chair?: Yes  Dress and groom yourself?: Yes    Bathe or shower yourself?: Yes    Feed yourself? Yes  Do your laundry/housekeeping?: Yes  Manage your money, pay your bills and track your expenses?: Yes  Make your own meals?: Yes    Do your own shopping?: Yes    Previous Hospitalizations:   Any hospitalizations or ED visits within the last 12 months?: Yes    How many hospitalizations have you had in the last year?: 1-2    Advance Care Planning:   Living will: No    Durable POA for healthcare: No    Advanced directive: No      PREVENTIVE SCREENINGS        Prostate Cancer Screening:    General: Screening Not Indicated      Lung Cancer Screening:     General: Screening Not Indicated      Hepatitis C Screening:    General: Screening Current    Screening, Brief Intervention, and Referral to Treatment (SBIRT)    Screening  Typical number of drinks in a day: 0  Typical number of drinks in a week: 0  Interpretation: Low risk drinking behavior  Single Item Drug Screening:  How often have you used an illegal drug (including marijuana) or a prescription medication for non-medical reasons in the past year? never    Single Item Drug Screen Score: 0  Interpretation: Negative screen for possible drug use disorder    No exam data present     Physical Exam:     There were no vitals taken for this visit      Physical Exam     Elizabeth Santana DO

## 2022-05-06 NOTE — PROGRESS NOTES
Assessment/Plan:  Chief Complaint   Patient presents with    New Patient Visit     new pt  post kidney transplant 5 weeks, pt follows [facility], sneezing, stuffy, congested, ear pain, throat pain x 05/02/22     Medicare Wellness Visit     Patient Instructions       Medicare Preventive Visit Patient Instructions  Thank you for completing your Welcome to Medicare Visit or Medicare Annual Wellness Visit today  Your next wellness visit will be due in one year (5/7/2023)  The screening/preventive services that you may require over the next 5-10 years are detailed below  Some tests may not apply to you based off risk factors and/or age  Screening tests ordered at today's visit but not completed yet may show as past due  Also, please note that scanned in results may not display below  Preventive Screenings:  Service Recommendations Previous Testing/Comments   Colorectal Cancer Screening  · Colonoscopy    · Fecal Occult Blood Test (FOBT)/Fecal Immunochemical Test (FIT)  · Fecal DNA/Cologuard Test  · Flexible Sigmoidoscopy Age: 54-65 years old   Colonoscopy: every 10 years (May be performed more frequently if at higher risk)  OR  FOBT/FIT: every 1 year  OR  Cologuard: every 3 years  OR  Sigmoidoscopy: every 5 years  Screening may be recommended earlier than age 48 if at higher risk for colorectal cancer  Also, an individualized decision between you and your healthcare provider will decide whether screening between the ages of 74-80 would be appropriate   Colonoscopy: Not on file  FOBT/FIT: Not on file  Cologuard: Not on file  Sigmoidoscopy: Not on file          Prostate Cancer Screening Individualized decision between patient and health care provider in men between ages of 53-78   Medicare will cover every 12 months beginning on the day after your 50th birthday PSA: No results in last 5 years     Screening Not Indicated     Hepatitis C Screening Once for adults born between [birth year] and 1965  More frequently in patients at high risk for Hepatitis C Hep C Antibody: 04/06/2022    Screening Current   Diabetes Screening 1-2 times per year if you're at risk for diabetes or have pre-diabetes Fasting glucose: No results in last 5 years   A1C: No results in last 5 years        Cholesterol Screening Once every 5 years if you don't have a lipid disorder  May order more often based on risk factors  Lipid panel: Not on file           Other Preventive Screenings Covered by Medicare:  1  Abdominal Aortic Aneurysm (AAA) Screening: covered once if your at risk  You're considered to be at risk if you have a family history of AAA or a male between the age of 73-68 who smoking at least 100 cigarettes in your lifetime  2  Lung Cancer Screening: covers low dose CT scan once per year if you meet all of the following conditions: (1) Age 50-69; (2) No signs or symptoms of lung cancer; (3) Current smoker or have quit smoking within the last 15 years; (4) You have a tobacco smoking history of at least 30 pack years (packs per day x number of years you smoked); (5) You get a written order from a healthcare provider  3  Glaucoma Screening: covered annually if you're considered high risk: (1) You have diabetes OR (2) Family history of glaucoma OR (3)  aged 48 and older OR (3)  American aged 72 and older  3  Osteoporosis Screening: covered every 2 years if you meet one of the following conditions: (1) Have a vertebral abnormality; (2) On glucocorticoid therapy for more than 3 months; (3) Have primary hyperparathyroidism; (4) On osteoporosis medications and need to assess response to drug therapy  5  HIV Screening: covered annually if you're between the age of 12-76  Also covered annually if you are younger than 13 and older than 72 with risk factors for HIV infection  For pregnant patients, it is covered up to 3 times per pregnancy      Immunizations:  Immunization Recommendations   Influenza Vaccine Annual influenza vaccination during flu season is recommended for all persons aged >= 6 months who do not have contraindications   Pneumococcal Vaccine (Prevnar and Pneumovax)  * Prevnar = PCV13  * Pneumovax = PPSV23 Adults 25-60 years old: 1-3 doses may be recommended based on certain risk factors  Adults 72 years old: Prevnar (PCV13) vaccine recommended followed by Pneumovax (PPSV23) vaccine  If already received PPSV23 since turning 65, then PCV13 recommended at least one year after PPSV23 dose  Hepatitis B Vaccine 3 dose series if at intermediate or high risk (ex: diabetes, end stage renal disease, liver disease)   Tetanus (Td) Vaccine - COST NOT COVERED BY MEDICARE PART B Following completion of primary series, a booster dose should be given every 10 years to maintain immunity against tetanus  Td may also be given as tetanus wound prophylaxis  Tdap Vaccine - COST NOT COVERED BY MEDICARE PART B Recommended at least once for all adults  For pregnant patients, recommended with each pregnancy  Shingles Vaccine (Shingrix) - COST NOT COVERED BY MEDICARE PART B  2 shot series recommended in those aged 48 and above     Health Maintenance Due:      Topic Date Due    HIV Screening  Completed    Hepatitis C Screening  Completed     Immunizations Due:      Topic Date Due    Pneumococcal Vaccine: Pediatrics (0 to 5 Years) and At-Risk Patients (6 to 59 Years) (1 of 4 - PCV13) Never done    COVID-19 Vaccine (1) Never done    DTaP,Tdap,and Td Vaccines (1 - Tdap) Never done     Advance Directives   What are advance directives? Advance directives are legal documents that state your wishes and plans for medical care  These plans are made ahead of time in case you lose your ability to make decisions for yourself  Advance directives can apply to any medical decision, such as the treatments you want, and if you want to donate organs  What are the types of advance directives?   There are many types of advance directives, and each state has rules about how to use them  You may choose a combination of any of the following:  · Living will: This is a written record of the treatment you want  You can also choose which treatments you do not want, which to limit, and which to stop at a certain time  This includes surgery, medicine, IV fluid, and tube feedings  · Durable power of  for healthcare [facility]): This is a written record that states who you want to make healthcare choices for you when you are unable to make them for yourself  This person, called a proxy, is usually a family member or a friend  You may choose more than 1 proxy  · Do not resuscitate (DNR) order:  A DNR order is used in case your heart stops beating or you stop breathing  It is a request not to have certain forms of treatment, such as CPR  A DNR order may be included in other types of advance directives  · Medical directive: This covers the care that you want if you are in a coma, near death, or unable to make decisions for yourself  You can list the treatments you want for each condition  Treatment may include pain medicine, surgery, blood transfusions, dialysis, IV or tube feedings, and a ventilator (breathing machine)  · Values history: This document has questions about your views, beliefs, and how you feel and think about life  This information can help others choose the care that you would choose  Why are advance directives important? An advance directive helps you control your care  Although spoken wishes may be used, it is better to have your wishes written down  Spoken wishes can be misunderstood, or not followed  Treatments may be given even if you do not want them  An advance directive may make it easier for your family to make difficult choices about your care     Weight Management   Why it is important to manage your weight:  Being overweight increases your risk of health conditions such as heart disease, high blood pressure, type 2 diabetes, and certain types of cancer  It can also increase your risk for osteoarthritis, sleep apnea, and other respiratory problems  Aim for a slow, steady weight loss  Even a small amount of weight loss can lower your risk of health problems  How to lose weight safely:  A safe and healthy way to lose weight is to eat fewer calories and get regular exercise  You can lose up about 1 pound a week by decreasing the number of calories you eat by 500 calories each day  Healthy meal plan for weight management:  A healthy meal plan includes a variety of foods, contains fewer calories, and helps you stay healthy  A healthy meal plan includes the following:  · Eat whole-grain foods more often  A healthy meal plan should contain fiber  Fiber is the part of grains, fruits, and vegetables that is not broken down by your body  Whole-grain foods are healthy and provide extra fiber in your diet  Some examples of whole-grain foods are whole-wheat breads and pastas, oatmeal, brown rice, and bulgur  · Eat a variety of vegetables every day  Include dark, leafy greens such as spinach, kale, perez greens, and mustard greens  Eat yellow and orange vegetables such as carrots, sweet potatoes, and winter squash  · Eat a variety of fruits every day  Choose fresh or canned fruit (canned in its own juice or light syrup) instead of juice  Fruit juice has very little or no fiber  · Eat low-fat dairy foods  Drink fat-free (skim) milk or 1% milk  Eat fat-free yogurt and low-fat cottage cheese  Try low-fat cheeses such as mozzarella and other reduced-fat cheeses  · Choose meat and other protein foods that are low in fat  Choose beans or other legumes such as split peas or lentils  Choose fish, skinless poultry (chicken or turkey), or lean cuts of red meat (beef or pork)  Before you cook meat or poultry, cut off any visible fat  · Use less fat and oil  Try baking foods instead of frying them   Add less fat, such as margarine, sour cream, regular salad dressing and mayonnaise to foods  Eat fewer high-fat foods  Some examples of high-fat foods include french fries, doughnuts, ice cream, and cakes  · Eat fewer sweets  Limit foods and drinks that are high in sugar  This includes candy, cookies, regular soda, and sweetened drinks  Exercise:  Exercise at least 30 minutes per day on most days of the week  Some examples of exercise include walking, biking, dancing, and swimming  You can also fit in more physical activity by taking the stairs instead of the elevator or parking farther away from stores  Ask your healthcare provider about the best exercise plan for you  © Copyright [employer] 2018 Information is for End User's use only and may not be sold, redistributed or otherwise used for commercial purposes  All illustrations and images included in CareNotes® are the copyrighted property of [employer]  or [facility] for establishing care and will need to see transplant team for recent kidney transplant left  His BP is stable today and cold symptoms are much better and will call if worse  Refilled Procardia XL 30 mg daily  No problem-specific Assessment & Plan notes found for this encounter  Diagnoses and all orders for this visit:    Benign hypertension  Comments:  Refilled BP med today  Orders:  -     NIFEdipine (PROCARDIA XL) 30 mg 24 hr tablet; Take 1 tablet (30 mg total) by mouth daily    Sneezing    Stuffy nose    Overweight (BMI 25 0-29  9)    Other orders  -     magnesium chloride-calcium (SLOW-MAG) 71 5-119 mg; Take 143 mg by mouth 2 (two) times a day  -     cinacalcet (SENSIPAR) 30 mg tablet; Take 30 mg by mouth daily  -     Aspirin Adult Low Strength 81 MG EC tablet  -     mycophenolate (CELLCEPT) 250 mg capsule  -     predniSONE 5 mg tablet; Take 10 mg by mouth daily  -     sodium bicarbonate 650 mg tablet; Take 1,300 mg by mouth Three times a day  -     Sodium Zirconium Cyclosilicate 5 g PACK;  Take 5 g by mouth  -     sulfamethoxazole-trimethoprim (BACTRIM) 400-80 mg per tablet  -     Envarsus XR 1 MG TB24  -     Envarsus XR 4 MG TB24  -     valGANciclovir (VALCYTE) 450 mg tablet; Take by mouth  -     acetaminophen (TYLENOL) 325 mg tablet; Take 650 mg by mouth          Subjective:      Patient ID: Sharita Blackman is a 36 y o  male  New Patient Visit (new pt  post kidney transplant 5 weeks, pt follows [facility], sneezing, stuffy, congested, ear pain, throat pain x 05/02/22 )  Medicare Wellness Visit     and has 1 daughter aged 24, graduating tomorrow  Patient is a maintenance fide at [employer]  Patient is [place] [facility] and has been here since then  Takes all meds as directed and sees transplant surgeons twice weekly then once weekly then every other week starting in June  The following portions of the patient's history were reviewed and updated as appropriate: allergies, current medications, past family history, past medical history, past social history, past surgical history and problem list     Review of Systems   Constitutional: Negative  HENT:        Cold symptoms stable   Eyes: Negative  Respiratory: Negative  Cardiovascular: Negative  Gastrointestinal: Negative  Endocrine: Negative  Genitourinary: Negative  Musculoskeletal: Negative  Skin: Negative  Allergic/Immunologic: Negative  Neurological: Negative  Hematological: Negative  Psychiatric/Behavioral: Negative  Objective:      /87   Pulse 96   Temp 97 6 °F (36 4 °C) (Temporal)   Resp 16   Ht 6' (1 829 m)   Wt 100 kg (220 lb 12 8 oz)   SpO2 99%   BMI 29 95 kg/m²          Physical Exam  Constitutional:       Appearance: He is well-developed  HENT:      Head: Normocephalic and atraumatic        Right Ear: External ear normal       Left Ear: External ear normal       Nose: Nose normal    Eyes:      Conjunctiva/sclera: Conjunctivae normal       Pupils: Pupils are equal, round, and reactive to light  Cardiovascular:      Rate and Rhythm: Normal rate and regular rhythm  Heart sounds: Normal heart sounds  Pulmonary:      Effort: Pulmonary effort is normal       Breath sounds: Normal breath sounds  Abdominal:      General: Abdomen is flat  Bowel sounds are normal       Palpations: Abdomen is soft  Musculoskeletal:         General: Normal range of motion  Cervical back: Normal range of motion and neck supple  Skin:     General: Skin is warm and dry  Neurological:      Mental Status: He is alert and oriented to person, place, and time  Deep Tendon Reflexes: Reflexes are normal and symmetric     Psychiatric:         Behavior: Behavior normal

## 2022-05-06 NOTE — PATIENT INSTRUCTIONS

## 2022-09-01 ENCOUNTER — RA CDI HCC (OUTPATIENT)
Dept: OTHER | Facility: HOSPITAL | Age: 42
End: 2022-09-01

## 2022-09-01 NOTE — PROGRESS NOTES
[address]  coding opportunities       Chart reviewed, no opportunity found: CHART REVIEWED, NO OPPORTUNITY FOUND        Patients Insurance     Medicare Insurance: Medicare

## 2022-10-18 ENCOUNTER — OFFICE VISIT (OUTPATIENT)
Dept: DERMATOLOGY | Facility: CLINIC | Age: 42
End: 2022-10-18

## 2022-10-18 VITALS — HEIGHT: 72 IN | BODY MASS INDEX: 28.71 KG/M2 | WEIGHT: 212 LBS

## 2022-10-18 DIAGNOSIS — B36.0 TINEA VERSICOLOR: Primary | ICD-10-CM

## 2022-10-18 DIAGNOSIS — D48.5 NEOPLASM OF UNCERTAIN BEHAVIOR OF SKIN: ICD-10-CM

## 2022-10-18 DIAGNOSIS — D22.9 MULTIPLE BENIGN MELANOCYTIC NEVI: ICD-10-CM

## 2022-10-18 RX ORDER — AMOXICILLIN 500 MG/1
500 CAPSULE ORAL 3 TIMES DAILY
COMMUNITY
Start: 2022-10-13

## 2022-10-18 RX ORDER — KETOCONAZOLE 20 MG/G
CREAM TOPICAL
Qty: 60 G | Refills: 5 | Status: SHIPPED | OUTPATIENT
Start: 2022-10-18

## 2022-10-18 NOTE — PROGRESS NOTES
[facility] Note     Patient Name: Dolores Morris  Encounter Date: 10/18/22    • Have you been cared for by a [facility] in the last 3 years and, if so, which one? No    · Have you traveled outside of the [facility] in the past 3 months? No    • May we call your Preferred Phone number to discuss your specific medical information? Yes    • May we leave a detailed message that includes your specific medical information? Yes      Today's Chief Concerns:  • Concern #1:  Skin exam      Past Medical History:  Have you personally ever had or currently have any of the following? · Skin cancer (such as Melanoma, Basal Cell Carcinoma, Squamous Cell Carcinoma? (If Yes, please provide more detail)- No  · Eczema: No  · Psoriasis: No  · HIV/AIDS: No  · Hepatitis B or C: YES - hepatitis B (1993)  · Tuberculosis: No  · Systemic Immunosuppression such as Diabetes, Biologic or Immunotherapy, Chemotherapy, Organ Transplantation, Bone Marrow Transplantation (If YES, please provide more detail): YES, kidney transplant x 2  · Radiation Treatment (If YES, please provide more detail): No  · Any other major medical conditions/concerns? 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 No  · Eczema, Asthma, Hay Fever or Seasonal Allergies: No  · Psoriasis or Psoriatic Arthritis: No  · Do any other medical conditions seem to run in your family? If Yes, what condition and which relatives?   YES, heart disease    Current Medications :    Current Outpatient Medications:   •  acetaminophen (TYLENOL) 325 mg tablet, Take 650 mg by mouth, Disp: , Rfl:   •  amoxicillin (AMOXIL) 500 mg capsule, Take 500 mg by mouth 3 (three) times a day, Disp: , Rfl:   •  Aspirin Adult Low Strength 81 MG EC tablet, , Disp: , Rfl:   •  cinacalcet (SENSIPAR) 30 mg tablet, Take 30 mg by mouth daily, Disp: , Rfl:   •  Envarsus XR 1 MG TB24, , Disp: , Rfl:   •  Envarsus XR 4 MG TB24, , Disp: , Rfl:   •  labetalol (NORMODYNE) 300 mg tablet, take 1 tablet by mouth three times a day, Disp: 90 tablet, Rfl: 3  •  magnesium chloride-calcium (SLOW-MAG) 71 5-119 mg, Take 143 mg by mouth 2 (two) times a day, Disp: , Rfl:   •  mycophenolate (CELLCEPT) 250 mg capsule, , Disp: , Rfl:   •  NIFEdipine (PROCARDIA XL) 30 mg 24 hr tablet, Take 1 tablet (30 mg total) by mouth daily, Disp: 30 tablet, Rfl: 5  •  omeprazole (PriLOSEC) 20 mg delayed release capsule, take 1 capsule by mouth once daily, Disp: 30 capsule, Rfl: 5  •  predniSONE 5 mg tablet, Take 10 mg by mouth daily, Disp: , Rfl:   •  sodium bicarbonate 650 mg tablet, Take 1,300 mg by mouth Three times a day, Disp: , Rfl:   •  Sodium Zirconium Cyclosilicate 5 g PACK, Take 5 g by mouth, Disp: , Rfl:   •  sulfamethoxazole-trimethoprim (BACTRIM) 400-80 mg per tablet, , Disp: , Rfl:   •  valGANciclovir (VALCYTE) 450 mg tablet, Take by mouth, Disp: , Rfl:       Review of Systems/System Alerts:  Have you recently had or currently have any of the following? If YES, what are you doing for the problem? · Fever, chills or unintended weight loss: No  · Sudden loss or change in your vision: No  · Nausea, vomiting or blood in your stool: No  · Painful or swollen joints: No  · Wheezing or cough: No  · Changing mole or non-healing wound: No  · Nosebleeds: No  · Excessive sweating: No  · Easy or prolonged bleeding? No  · Over the last 2 weeks, how often have you been bothered by the following problems? · Taking little interest or pleasure in doing things: 1 - Not at All  · Feeling down, depressed, or hopeless: 1 - Not at All  · Rapid heartbeat with epinephrine:  No  · Any known allergies?   YES, see below  Allergies   Allergen Reactions   • Prednisone Other (See Comments) and Swelling     Dizzy, bloated, shakey  Edema with high doses  Reports as an intolerance           PHYSICAL EXAM:      • Was a chaperone (Derm Clinical Assistant) present throughout the entire Physical Exam? Yes    • Did the Dermatology Team specifically  the patient on the importance of a Full Skin Exam to be sure that nothing is missed clinically? Yes}  o Did the patient request or accept a Full Skin Exam?  Yes  o Did the patient specifically refuse to have the areas "under-the-bra" examined by the Dermatologist? No  o Did the patient specifically refuse to have the areas "under-the-underwear" examined by the Dermatologist? No      CONSTITUTIONAL:   Vitals:    10/18/22 1425   Weight: 96 2 kg (212 lb)   Height: 6' (1 829 m)         PSYCH: Normal mood and affect  EYES: Normal conjunctiva  ENT: Normal lips and oral mucosa  CARDIOVASCULAR: No edema  RESPIRATORY: Normal respirations  HEME/LYMPH/IMMUNO:  No regional lymphadenopathy except as noted below in ASSESSMENT AND PLAN BY DIAGNOSIS    SKIN:  FULL ORGAN SYSTEM EXAM  Hair, Scalp, Ears, Face Normal except as noted below in Assessment   Neck, Cervical Chain Nodes Normal except as noted below in Assessment   Right Arm/Hand/Fingers Normal except as noted below in Assessment   Left Arm/Hand/Fingers Normal except as noted below in Assessment   Chest/Breasts/Axillae Viewed areas Normal except as noted below in Assessment   Abdomen, Umbilicus Normal except as noted below in Assessment   Back/Spine Normal except as noted below in Assessment   Groin/Genitalia/Buttocks Normal except as noted below in Assessment   Right Leg, Foot, Toes Normal except as noted below in Assessment   Left Leg, Foot, Toes Normal except as noted below in Assessment        43 YO MALE WITH RENAL TRANSPLANT ON CELLCEPT, PREDNISONE  NOW ON SECOND KIDNEY GRAFT  FIRST TRANSPLANT ROUGHLY 15 YEARS AGO  NO HISTORY OF SKIN CANCER      TINEA VERSICOLOR    Physical Exam:  • Anatomic Location Affected:  trunk  • Morphological Description:  Scaling patches  • Pertinent Positives:  • Pertinent Negatives: Additional History of Present Condition:  Discovered upon examination    Assessment and Plan:  Based on a thorough discussion of this condition and the management approach to it (including a comprehensive discussion of the known risks, side effects and potential benefits of treatment), the patient (family) agrees to implement the following specific plan:    • Apply ketoconazole 2% cream twice daily until rash is clear    What is tinea versicolor? Tinea versicolor or pityriasis versicolor is a type of fungal infection on the skin due to a yeast that lives on all of us  It Is due an overgrowth of a type of yeast called Malassezia furfur, which feeds on oils in the skin and thrives in warm, humid environments  Anyone can develop tinea versicolor, but it is more common during the summer months and in tropical climates  Those who tend to sweat more heavily are also at higher risk  Although it is not considered infectious, multiple family members can be affected  - Teens and young adults are most susceptible due to having oily skin   - Affects people of all skin colors   - Weakened immune system predisposes to development     What are the clinical symptoms of tinea versicolor? The first sign of tinea versicolor is often spots on the skin  They can be lighter or darker than surrounding skin, with colors ranging from white, pink, tan, to brown  - The spots are dry, scaly, and sometimes itchy   - Can appear anywhere on the body, but more commonly over the neck, trunk, and arms   - Spots can grow together forming larger patches   - May disappear when temperature drops and return once it becomes warm again  - Pale spots can be confused with vitiligo    How do we diagnose tinea versicolor?   Tinea versicolor is usually diagnosed with a history and physical examination  However, the following tests may be useful for confirmation when in doubt  - Wood lamp (black light) examination-- yellow-green glow may be observed in affected areas  - Microscopy using potassium hydroxide (KOH) to examine skin scrapings  - Fungal culture--this is usually reported to be negative, as it is quite difficult to persuade the yeasts to grow in a laboratory  - Skin biopsy--fungal elements may be seen within the outer cells of the skin (stratum corneum) on histopathology    How do we treat tinea versicolor? There are many different options to treat tinea versicolor  The treatment chosen may depend on how thick the spots have grown and how much of the body has been affected  Mild tinea versicolor can be treated with primarily topical antifungal agents  These include:  - Ketoconazole cream/shampoo  - Selenium sulfide   - Terbinafine gel   - Ciclopirox cream/solution   - Propylene glycol solution   - Sodium thiosulphate solution   Topical medications should be applied widely to affected areas before bedtime for between three days to two weeks depending on your dermatologists recommendation    - Use of medicated cleansers once or twice a month may prevent recurrence in those who have has multiple bouts of yeast overgrowth     For extensive skin involvement or after failure of topical medications, oral antifungal agents such as itraconazole and fluconazole can be used   Oral terbinafine used to treat dermatophyte infections is not effective against tinea versicolor    - Vigorous exercise an hour after taking the medication may help sweat it onto the skin surface and enhance clearance of the yeast    NEOPLASM OF UNCERTAIN BEHAVIOR OF SKIN    Physical Exam:  • (Anatomic Location); (Size and Morphological Description); (Differential Diagnosis):  o Specimen A Right lower back; skin; shave; 2 mm dark brown papule; ddx; nevus versus atypical nevus versus melanoma  o Specimen B Left lateral abdomen; skin; shave biopsy; 1 3 cm brown waxy papule; ddx; Seborrheic Keratosis; rule out atypical melanocytic lesion  o   • Pertinent Positives:  • Pertinent Negatives:        Assessment and Plan:  • I have discussed with the patient that a sample of skin via a "skin biopsy” would be potentially helpful to further make a specific diagnosis under the microscope  • Based on a thorough discussion of this condition and the management approach to it (including a comprehensive discussion of the known risks, side effects and potential benefits of treatment), the patient (family) agrees to implement the following specific plan:    o Procedure:  Skin Biopsy  After a thorough discussion of treatment options and risk/benefits/alternatives (including but not limited to local pain, scarring, dyspigmentation, blistering, possible superinfection, and inability to confirm a diagnosis via histopathology), verbal and written consent were obtained and portion of the rash was biopsied for tissue sample  See below for consent that was obtained from patient and subsequent Procedure Note      PROCEDURE TANGENTIAL (SHAVE) BIOPSY NOTE:    • Performing Physician: Sigifredo Pina  • Anatomic Location; Clinical Description with size (cm); Pre-Op Diagnosis:     Specimen A Right lower back; skin; shave; 2 mm dark brown papule; ddx; nevus   verses atypical nevus verses melanoma  • Post-op diagnosis: Same     • Local anesthesia: 2% Lidocaine  HCL     • Topical anesthesia: None    • Hemostasis: Aluminum chloride       PROCEDURE TANGENTIAL (SHAVE) BIOPSY NOTE:    • Performing Physician: Sigifredo Pina  • Anatomic Location; Clinical Description with size (cm); Pre-Op Diagnosis: Specimen B Left lateral abdomen; skin; shave biopsy; 1 3 cm brown waxy papule; ddx; Seborrheic Keratosis verses   • Post-op diagnosis: Same     • Local anesthesia: 2% Lidocaine  HCL     • Topical anesthesia: None    • Hemostasis: Aluminum chloride     After obtaining informed consent  at which time there was a discussion about the purpose of biopsy  and low risks of infection and bleeding  The area was prepped and draped in the usual fashion  Anesthesia was obtained with 1% lidocaine with epinephrine  A shave biopsy to an appropriate sampling depth was obtained by Shave (Dermablade or 15 blade) The resulting wound was covered with surgical ointment and bandaged appropriately  The patient tolerated the procedure well without complications and was without signs of functional compromise  Specimen has been sent for review by Dermatopathology  Standard post-procedure care has been explained and has been included in written form within the patient's copy of Informed Consent  INFORMED CONSENT DISCUSSION AND POST-OPERATIVE INSTRUCTIONS FOR PATIENT    I   RATIONALE FOR PROCEDURE  I understand that a skin biopsy allows the Dermatologist to test a lesion or rash under the microscope to obtain a diagnosis  It usually involves numbing the area with numbing medication and removing a small piece of skin; sometimes the area will be closed with sutures  In this specific procedure, sutures are not usually needed  If any sutures are placed, then they are usually need to be removed in 2 weeks or less  I understand that my Dermatologist recommends that a skin "shave" biopsy be performed today  A local anesthetic, similar to the kind that a dentist uses when filling a cavity, will be injected with a very small needle into the skin area to be sampled  The injected skin and tissue underneath "will go to sleep” and become numb so no pain should be felt afterwards  An instrument shaped like a tiny "razor blade" (shave biopsy instrument) will be used to cut a small piece of tissue and skin from the area so that a sample of tissue can be taken and examined more closely under the microscope    A slight amount of bleeding will occur, but it will be stopped with direct pressure and a pressure bandage and any other appropriate methods  I understands that a scar will form where the wound was created  Surgical ointment will be applied to help protect the wound  Sutures are not usually needed  II   RISKS AND POTENTIAL COMPLICATIONS   I understand the risks and potential complications of a skin biopsy include but are not limited to the following:  • Bleeding  • Infection  • Pain  • Scar/keloid  • Skin discoloration  • Incomplete Removal  • Recurrence  • Nerve Damage/Numbness/Loss of Function  • Allergic Reaction to Anesthesia  • Biopsies are diagnostic procedures and based on findings additional treatment or evaluation may be required  • Loss or destruction of specimen resulting in no additional findings    My Dermatologist has explained to me the nature of the condition, the nature of the procedure, and the benefits to be reasonably expected compared with alternative approaches  My Dermatologist has discussed the likelihood of major risks or complications of this procedure including the specific risks listed above, such as bleeding, infection, and scarring/keloid  I understand that a scar is expected after this procedure  I understand that my physician cannot predict if the scar will form a "keloid," which extends beyond the borders of the wound that is created  A keloid is a thick, painful, and bumpy scar  A keloid can be difficult to treat, as it does not always respond well to therapy, which includes injecting cortisone directly into the keloid every few weeks  While this usually reduces the pain and size of the scar, it does not eliminate it  I understand that photographs may be taken before and after the procedure  These will be maintained as part of the medical providers confidential records and may not be made available to me    I further authorize the medical provider to use the photographs for teaching purposes or to illustrate scientific papers, books, or lectures if in his/her judgment, medical research, education, or science may benefit from its use  I have had an opportunity to fully inquire about the risks and benefits of this procedure and its alternatives  I have been given ample time and opportunity to ask questions and to seek a second opinion if I wished to do so  I acknowledge that there have specifically been no guarantees as to the cosmetic results from the procedure  I am aware that with any procedure there is always the possibility of an unexpected complication  III  POST-PROCEDURAL CARE (WHAT YOU WILL NEED TO DO "AFTER THE BIOPSY" TO OPTIMIZE HEALING)    • Keep the area clean and dry  Try NOT to remove the bandage or get it wet for the first 24 hours  • Gently clean the area and apply surgical ointment (such as Vaseline petrolatum ointment, which is available "over the counter" and not a prescription) to the biopsy site for up to 2 weeks straight  This acts to protect the wound from the outside world  • Sutures are not usually placed in this procedure  If any sutures were placed, return for suture removal as instructed (generally 1 week for the face, 2 weeks for the body)  • Take Acetaminophen (Tylenol) for discomfort, if no contraindications  Ibuprofen or aspirin could make bleeding worse  • Call our office immediately for signs of infection: fever, chills, increased redness, warmth, tenderness, discomfort/pain, or pus or foul smell coming from the wound  WHAT TO DO IF THERE IS ANY BLEEDING? If a small amount of bleeding is noticed, place a clean cloth over the area and apply firm pressure for ten minutes  Check the wound after 10 minutes of direct pressure  If bleeding persists, try one more time for an additional 10 minutes of direct pressure on the area    If the bleeding becomes heavier or does not stop after the second attempt, or if you have any other questions about this procedure, then please call your [facility] by calling [phone] (SKIN)  I hereby acknowledge that I have reviewed and verified the site with my Dermatologist and have requested and authorized my Dermatologist to proceed with the procedure  MELANOCYTIC NEVI ("Moles")    Physical Exam:  • Anatomic Location Affected:   Mostly on sun-exposed areas of the trunk and extremities  • Morphological Description:  Scattered, 1-4mm round to ovoid, symmetric and evenly bordered, regularly pigmented macules/papules without outliers other than if noted elsewhere in today's note  • Pertinent Positives:  • Pertinent Negatives: Additional History of Present Condition:      Assessment and Plan:  Based on a thorough discussion of this condition and the management approach to it (including a comprehensive discussion of the known risks, side effects and potential benefits of treatment), the patient (family) agrees to implement the following specific plan:  • The patient was encouraged to use an SPF30+ broad spectrum sunscreen daily and re-apply every 2-3 outdoors while outside  The importance of sun protection, self-skin exams, and sun avoidance was emphasized  An annual full body skin exam is recommended, and the patient was encouraged to return to the office sooner for any new or changing lesions of concerns  • Benign, reassured  • Annual skin check     Melanocytic Nevi  Melanocytic nevi ("moles") are tan or brown, raised or flat areas of the skin which have an increased number of melanocytes  Melanocytes are the cells in our body which make pigment and account for skin color  Some moles are present at birth (I e , "congenital nevi"), while others come up later in life (i e , "acquired nevi")  The sun can stimulate the body to make more moles  Sunburns are not the only thing that triggers more moles  Chronic sun exposure can do it too  Clinically distinguishing a healthy mole from melanoma may be difficult, even for experienced dermatologists   The "ABCDE's" of moles have been suggested as a means of helping to alert a person to a suspicious mole and the possible increased risk of melanoma  The suggestions for raising alert are as follows:    Asymmetry: Healthy moles tend to be symmetric, while melanomas are often asymmetric  Asymmetry means if you draw a line through the mole, the two halves do not match in color, size, shape, or surface texture  Asymmetry can be a result of rapid enlargement of a mole, the development of a raised area on a previously flat lesion, scaling, ulceration, bleeding or scabbing within the mole  Any mole that starts to demonstrate "asymmetry" should be examined promptly by a board certified dermatologist      Border: Healthy moles tend to have discrete, even borders  The border of a melanoma often blends into the normal skin and does not sharply delineate the mole from normal skin  Any mole that starts to demonstrate "uneven borders" should be examined promptly by a board certified dermatologist      Color: Healthy moles tend to be one color throughout  Melanomas tend to be made up of different colors ranging from dark black, blue, white, or red  Any mole that demonstrates a color change should be examined promptly by a board certified dermatologist      Diameter: Healthy moles tend to be smaller than 0 6 cm in size; an exception are "congenital nevi" that can be larger  Melanomas tend to grow and can often be greater than 0 6 cm (1/4 of an inch, or the size of a pencil eraser)  This is only a guideline, and many normal moles may be larger than 0 6 cm without being unhealthy  Any mole that starts to change in size (small to bigger or bigger to smaller) should be examined promptly by a board certified dermatologist      Evolving: Healthy moles tend to "stay the same "  Melanomas may often show signs of change or evolution such as a change in size, shape, color, or elevation    Any mole that starts to itch, bleed, crust, burn, hurt, or ulcerate or demonstrate a change or evolution should be examined promptly by a board certified dermatologist         LENTIGO    Physical Exam:  • Anatomic Location Affected:  Sun exposed skin  • Morphological Description:  Light brown well demarcated macules on sun exposed skin with reassuring dermoscopy  • Pertinent Positives:  • Pertinent Negatives: Additional History of Present Condition:      Assessment and Plan:  Based on a thorough discussion of this condition and the management approach to it (including a comprehensive discussion of the known risks, side effects and potential benefits of treatment), the patient (family) agrees to implement the following specific plan:  • When outside we recommend using a wide brim hat, sunglasses, long sleeve and pants, sunscreen with SPF 41+ with reapplication every 2 hours, or SPF specific clothing       What is a lentigo? A lentigo is a pigmented flat or slightly raised lesion with a clearly defined edge  Unlike an ephelis (freckle), it does not fade in the winter months  There are several kinds of lentigo  The name lentigo originally referred to its appearance resembling a small lentil  The plural of lentigo is lentigines, although “lentigos” is also in common use  Who gets lentigines? Lentigines can affect males and females of all ages and races  Solar lentigines are especially prevalent in fair skinned adults  Lentigines associated with syndromes are present at birth or arise during childhood  What causes lentigines? Common forms of lentigo are due to exposure to ultraviolet radiation:  • Sun damage including sunburn   • Indoor tanning   • Phototherapy, especially photochemotherapy (PUVA)    Ionizing radiation, eg radiation therapy, can also cause lentigines  Several familial syndromes associated with widespread lentigines originate from mutations in Sedrick-MAP kinase, mTOR signaling and PTEN pathways  What is the treatment for lentigines?   Most lentigines are left alone  Attempts to lighten them may not be successful  The following approaches are used:  • SPF 50+ broad-spectrum sunscreen   • Hydroquinone bleaching cream   • Alpha hydroxy acids   • Vitamin C   • Retinoids   • Azelaic acid   • Chemical peels  Individual lesions can be permanently removed using:  • Cryotherapy   • Intense pulsed light   • Pigment lasers    How can lentigines be prevented? Lentigines associated with exposure ultraviolet radiation can be prevented by very careful sun protection  Clothing is more successful at preventing new lentigines than are sunscreens  What is the outlook for lentigines? Lentigines usually persist  They may increase in number with age and sun exposure  Some in sun-protected sites may fade and disappear  MARTIN ANGIOMAS    Physical Exam:  • Anatomic Location Affected:  trunk  • Morphological Description:  Scattered cherry red, variably sized papules  • Pertinent Positives:  • Pertinent Negatives: Additional History of Present Condition:      Assessment and Plan:  Based on a thorough discussion of this condition and the management approach to it (including a comprehensive discussion of the known risks, side effects and potential benefits of treatment), the patient (family) agrees to implement the following specific plan:  • Monitor for changes  • Benign, reassured  •     Assessment and Plan:    Cherry angioma, also known as [place] spots, are benign vascular skin lesions  A "cherry angioma" is a firm red, blue or purple papule, 0 1-1 cm in diameter  When thrombosed, they can appear black in colour until evaluated with a dermatoscope when the red or purple colour is more easily seen  Cherry angioma may develop on any part of the body but most often appear on the scalp, face, lips and trunk  An angioma is due to proliferating endothelial cells; these are the cells that line the inside of a blood vessel      Angiomas can arise in early life or later in life; the most common type of angioma is a cherry angioma  Cherry angiomas are very common in males and females of any age or race  They are more noticeable in white skin than in skin of colour  They markedly increase in number from about the age of 36  There may be a family history of similar lesions  Eruptive cherry angiomas have been rarely reported to be associated with internal malignancy  The cause of angiomas is unknown  Genetic analysis of cherry angiomas has shown that they frequently carry specific somatic missense mutations in the GNAQ and GNA11 (Q209H) genes, which are involved in other vascular and melanocytic proliferations  SEBORRHEIC KERATOSIS; NON-INFLAMED    Physical Exam:  • Anatomic Location Affected:  trunk  • Morphological Description:  Brown waxy variably sized "stuck-on" appearing papules with reassuring dermoscopy  • Pertinent Positives:  • Pertinent Negatives: Additional History of Present Condition:      Assessment and Plan:  Based on a thorough discussion of this condition and the management approach to it (including a comprehensive discussion of the known risks, side effects and potential benefits of treatment), the patient (family) agrees to implement the following specific plan:  • Monitor for changes  • Benign, reassured  •     Seborrheic Keratosis  A seborrheic keratosis is a harmless warty spot that appears during adult life as a common sign of skin aging  Seborrheic keratoses can arise on any area of skin, covered or uncovered, with the usual exception of the palms and soles  They do not arise from mucous membranes  Seborrheic keratoses can have highly variable appearance  Seborrheic keratoses are extremely common  It has been estimated that over 90% of adults over the age of 61 years have one or more of them  They occur in males and females of all races, typically beginning to erupt in the 35s or 45s  They are uncommon under the age of 21 years    The precise cause of seborrhoeic keratoses is not known  Seborrhoeic keratoses are considered degenerative in nature  As time goes by, seborrheic keratoses tend to become more numerous  Some people inherit a tendency to develop a very large number of them; some people may have hundreds of them  There is no easy way to remove multiple lesions on a single occasion  Unless a specific lesion is "inflamed" and is causing pain or stinging/burning or is bleeding, most insurance companies do not authorize treatment

## 2022-10-18 NOTE — PATIENT INSTRUCTIONS
TINEA VERSICOLOR      Assessment and Plan:  Based on a thorough discussion of this condition and the management approach to it (including a comprehensive discussion of the known risks, side effects and potential benefits of treatment), the patient (family) agrees to implement the following specific plan:    Apply ketoconazole 2% cream twice daily until rash is clear    What is tinea versicolor? Tinea versicolor or pityriasis versicolor is a type of fungal infection on the skin due to a yeast that lives on all of us  It Is due an overgrowth of a type of yeast called Malassezia furfur, which feeds on oils in the skin and thrives in warm, humid environments  Anyone can develop tinea versicolor, but it is more common during the summer months and in tropical climates  Those who tend to sweat more heavily are also at higher risk  Although it is not considered infectious, multiple family members can be affected  Teens and young adults are most susceptible due to having oily skin   Affects people of all skin colors   Weakened immune system predisposes to development     What are the clinical symptoms of tinea versicolor? The first sign of tinea versicolor is often spots on the skin  They can be lighter or darker than surrounding skin, with colors ranging from white, pink, tan, to brown  The spots are dry, scaly, and sometimes itchy   Can appear anywhere on the body, but more commonly over the neck, trunk, and arms   Spots can grow together forming larger patches   May disappear when temperature drops and return once it becomes warm again  Pale spots can be confused with vitiligo    How do we diagnose tinea versicolor? Tinea versicolor is usually diagnosed with a history and physical examination  However, the following tests may be useful for confirmation when in doubt    Wood lamp (black light) examination-- yellow-green glow may be observed in affected areas  Microscopy using potassium hydroxide (KOH) to examine skin scrapings  Fungal culture--this is usually reported to be negative, as it is quite difficult to persuade the yeasts to grow in a laboratory  Skin biopsy--fungal elements may be seen within the outer cells of the skin (stratum corneum) on histopathology    How do we treat tinea versicolor? There are many different options to treat tinea versicolor  The treatment chosen may depend on how thick the spots have grown and how much of the body has been affected  Mild tinea versicolor can be treated with primarily topical antifungal agents  These include:  Ketoconazole cream/shampoo  Selenium sulfide   Terbinafine gel   Ciclopirox cream/solution   Propylene glycol solution   Sodium thiosulphate solution   Topical medications should be applied widely to affected areas before bedtime for between three days to two weeks depending on your dermatologists recommendation  Use of medicated cleansers once or twice a month may prevent recurrence in those who have has multiple bouts of yeast overgrowth     For extensive skin involvement or after failure of topical medications, oral antifungal agents such as itraconazole and fluconazole can be used  Oral terbinafine used to treat dermatophyte infections is not effective against tinea versicolor  Vigorous exercise an hour after taking the medication may help sweat it onto the skin surface and enhance clearance of the yeast    MELANOCYTIC NEVI ("Moles")      Assessment and Plan:  Based on a thorough discussion of this condition and the management approach to it (including a comprehensive discussion of the known risks, side effects and potential benefits of treatment), the patient (family) agrees to implement the following specific plan: Will monitor for changes     Melanocytic Nevi  Melanocytic nevi ("moles") are caused by collections of the color producing skin cells, or melanocytes, in 1 area in the skin   They can range in color from pink to dark brown and be either raised or flat  Some moles are present at birth (I e , "congenital nevi"), while others come up later in life (i e , "acquired nevi")  Ambrose Layer exposure also stimulates the body to make more moles, ie the more sun you get the more moles you'll grow  Clinically distinguishing a healthy mole from melanoma may be difficult  The "ABCDE's" of moles have been suggested as a means of helping to alert a person to a suspicious mole and the possible increased risk of melanoma  Asymmetry: Healthy moles tend to be symmetric, while melanomas are often asymmetric  Asymmetry means if you draw a line through the mole, the two halves do not match in color, size, shape, or surface texture Any mole that starts to demonstrate "asymmetry" should be examined promptly by a board certified dermatologist      Border: Healthy moles tend to have discrete, even borders  The border of a melanoma often blends into the normal skin and does not sharply delineate the mole from normal skin  Any mole that starts to demonstrate "uneven borders" should be examined promptly     Color: Healthy moles tend to be one color throughout  Melanomas tend to be made up of different colors ranging from dark black, blue, white, or red  Any mole that demonstrates a color change should be examined promptly    Diameter: Healthy moles tend to be smaller than 0 6 cm in size; an exception are "congenital nevi" that can be larger  Melanomas tend to grow and can often be greater than 0 6 cm (1/4 of an inch, or the size of a pencil eraser)  This is only a guideline, and many normal moles may be larger than 0 6 cm without being unhealthy  Any mole that starts to change in size (small to bigger or bigger to smaller) should be examined promptly    Evolving: Healthy moles tend to "stay the same "  Melanomas may often show signs of change or evolution such as a change in size, shape, color, or elevation    Any mole that starts to itch, bleed, crust, burn, hurt, or ulcerate or demonstrate a change or evolution should be examined promptly by a board certified dermatologist       What are atypical moles or dysplastic nevi? Dysplastic moles are moles that have some of the ABCDE  changes listed above but  are not cancerous  Sometimes a biopsy and microscopic examination are needed to determine the difference  They may indicate an increased risk of melanoma in that person, especially if there is a family history of melanoma  What is a Melanoma? The main concern when looking at a new or changing mole it to evaluate whether it may be a melanoma  The appearance of a "new mole" remains one of the most reliable methods for identifying a malignant melanoma  A melanoma is a type of skin cancer that can be deadly if it spreads throughout the body  The prognosis of a Melanoma depends on how deep it has penetrated in the skin  If caught early, they generally will not have had time to grow into the deeper layers of the skin and they cure rate is then very high  Once the melanoma grows deeper into the skin, the cure rate drops dramatically  Therefore, early detection and removal of a malignant melanoma results in a much better chance of complete cure  NEOPLASM OF UNCERTAIN BEHAVIOR OF SKIN        Assessment and Plan:  I have discussed with the patient that a sample of skin via a "skin biopsy” would be potentially helpful to further make a specific diagnosis under the microscope  Based on a thorough discussion of this condition and the management approach to it (including a comprehensive discussion of the known risks, side effects and potential benefits of treatment), the patient (family) agrees to implement the following specific plan:    Procedure:  Skin Biopsy    After a thorough discussion of treatment options and risk/benefits/alternatives (including but not limited to local pain, scarring, dyspigmentation, blistering, possible superinfection, and inability to confirm a diagnosis via histopathology), verbal and written consent were obtained and portion of the rash was biopsied for tissue sample  See below for consent that was obtained from patient and subsequent Procedure Note  PROCEDURE TANGENTIAL (SHAVE) BIOPSY NOTE:    Performing Physician: Dr Lynch    PROCEDURE TANGENTIAL (SHAVE) BIOPSY NOTE:    Performing Physician: Fabiana Doshi    After obtaining informed consent  at which time there was a discussion about the purpose of biopsy  and low risks of infection and bleeding  The area was prepped and draped in the usual fashion  Anesthesia was obtained with 1% lidocaine with epinephrine  A shave biopsy to an appropriate sampling depth was obtained by Shave (Dermablade or 15 blade) The resulting wound was covered with surgical ointment and bandaged appropriately  The patient tolerated the procedure well without complications and was without signs of functional compromise  Specimen has been sent for review by Dermatopathology  Standard post-procedure care has been explained and has been included in written form within the patient's copy of Informed Consent  INFORMED CONSENT DISCUSSION AND POST-OPERATIVE INSTRUCTIONS FOR PATIENT    I   RATIONALE FOR PROCEDURE  I understand that a skin biopsy allows the Dermatologist to test a lesion or rash under the microscope to obtain a diagnosis  It usually involves numbing the area with numbing medication and removing a small piece of skin; sometimes the area will be closed with sutures  In this specific procedure, sutures are not usually needed  If any sutures are placed, then they are usually need to be removed in 2 weeks or less  I understand that my Dermatologist recommends that a skin "shave" biopsy be performed today  A local anesthetic, similar to the kind that a dentist uses when filling a cavity, will be injected with a very small needle into the skin area to be sampled    The injected skin and tissue underneath "will go to sleep” and become numb so no pain should be felt afterwards  An instrument shaped like a tiny "razor blade" (shave biopsy instrument) will be used to cut a small piece of tissue and skin from the area so that a sample of tissue can be taken and examined more closely under the microscope  A slight amount of bleeding will occur, but it will be stopped with direct pressure and a pressure bandage and any other appropriate methods  I understands that a scar will form where the wound was created  Surgical ointment will be applied to help protect the wound  Sutures are not usually needed  II   RISKS AND POTENTIAL COMPLICATIONS   I understand the risks and potential complications of a skin biopsy include but are not limited to the following:  Bleeding  Infection  Pain  Scar/keloid  Skin discoloration  Incomplete Removal  Recurrence  Nerve Damage/Numbness/Loss of Function  Allergic Reaction to Anesthesia  Biopsies are diagnostic procedures and based on findings additional treatment or evaluation may be required  Loss or destruction of specimen resulting in no additional findings    My Dermatologist has explained to me the nature of the condition, the nature of the procedure, and the benefits to be reasonably expected compared with alternative approaches  My Dermatologist has discussed the likelihood of major risks or complications of this procedure including the specific risks listed above, such as bleeding, infection, and scarring/keloid  I understand that a scar is expected after this procedure  I understand that my physician cannot predict if the scar will form a "keloid," which extends beyond the borders of the wound that is created  A keloid is a thick, painful, and bumpy scar  A keloid can be difficult to treat, as it does not always respond well to therapy, which includes injecting cortisone directly into the keloid every few weeks    While this usually reduces the pain and size of the scar, it does not eliminate it  I understand that photographs may be taken before and after the procedure  These will be maintained as part of the medical providers confidential records and may not be made available to me  I further authorize the medical provider to use the photographs for teaching purposes or to illustrate scientific papers, books, or lectures if in his/her judgment, medical research, education, or science may benefit from its use  I have had an opportunity to fully inquire about the risks and benefits of this procedure and its alternatives  I have been given ample time and opportunity to ask questions and to seek a second opinion if I wished to do so  I acknowledge that there have specifically been no guarantees as to the cosmetic results from the procedure  I am aware that with any procedure there is always the possibility of an unexpected complication  III  POST-PROCEDURAL CARE (WHAT YOU WILL NEED TO DO "AFTER THE BIOPSY" TO OPTIMIZE HEALING)    Keep the area clean and dry  Try NOT to remove the bandage or get it wet for the first 24 hours  Gently clean the area and apply surgical ointment (such as Vaseline petrolatum ointment, which is available "over the counter" and not a prescription) to the biopsy site for up to 2 weeks straight  This acts to protect the wound from the outside world  Sutures are not usually placed in this procedure  If any sutures were placed, return for suture removal as instructed (generally 1 week for the face, 2 weeks for the body)  Take Acetaminophen (Tylenol) for discomfort, if no contraindications  Ibuprofen or aspirin could make bleeding worse  Call our office immediately for signs of infection: fever, chills, increased redness, warmth, tenderness, discomfort/pain, or pus or foul smell coming from the wound  WHAT TO DO IF THERE IS ANY BLEEDING?   If a small amount of bleeding is noticed, place a clean cloth over the area and apply firm pressure for ten minutes  Check the wound after 10 minutes of direct pressure  If bleeding persists, try one more time for an additional 10 minutes of direct pressure on the area  If the bleeding becomes heavier or does not stop after the second attempt, or if you have any other questions about this procedure, then please call your [facility] Dermatologist by calling [phone] (SKIN)  I hereby acknowledge that I have reviewed and verified the site with my Dermatologist and have requested and authorized my Dermatologist to proceed with the procedure

## 2022-11-09 ENCOUNTER — RA CDI HCC (OUTPATIENT)
Dept: OTHER | Facility: HOSPITAL | Age: 42
End: 2022-11-09

## 2022-11-14 DIAGNOSIS — I10 BENIGN HYPERTENSION: ICD-10-CM

## 2022-11-14 RX ORDER — NIFEDIPINE 30 MG/1
30 TABLET, EXTENDED RELEASE ORAL DAILY
Qty: 30 TABLET | Refills: 5 | Status: SHIPPED | OUTPATIENT
Start: 2022-11-14

## 2022-11-15 ENCOUNTER — OFFICE VISIT (OUTPATIENT)
Dept: FAMILY MEDICINE CLINIC | Facility: CLINIC | Age: 42
End: 2022-11-15

## 2022-11-15 VITALS
OXYGEN SATURATION: 99 % | RESPIRATION RATE: 16 BRPM | TEMPERATURE: 97.3 F | SYSTOLIC BLOOD PRESSURE: 116 MMHG | HEART RATE: 81 BPM | HEIGHT: 71 IN | WEIGHT: 208.6 LBS | DIASTOLIC BLOOD PRESSURE: 78 MMHG | BODY MASS INDEX: 29.2 KG/M2

## 2022-11-15 DIAGNOSIS — Z13.220 SCREENING FOR HYPERLIPIDEMIA: ICD-10-CM

## 2022-11-15 DIAGNOSIS — I10 BENIGN HYPERTENSION: ICD-10-CM

## 2022-11-15 DIAGNOSIS — K21.9 GASTROESOPHAGEAL REFLUX DISEASE, UNSPECIFIED WHETHER ESOPHAGITIS PRESENT: ICD-10-CM

## 2022-11-15 DIAGNOSIS — G47.00 INSOMNIA, UNSPECIFIED TYPE: ICD-10-CM

## 2022-11-15 DIAGNOSIS — Z00.00 HEALTH CARE MAINTENANCE: ICD-10-CM

## 2022-11-15 DIAGNOSIS — E78.5 HYPERLIPIDEMIA, UNSPECIFIED HYPERLIPIDEMIA TYPE: ICD-10-CM

## 2022-11-15 DIAGNOSIS — Z00.00 MEDICARE ANNUAL WELLNESS VISIT, SUBSEQUENT: Primary | ICD-10-CM

## 2022-11-15 DIAGNOSIS — E66.3 OVERWEIGHT (BMI 25.0-29.9): ICD-10-CM

## 2022-11-15 PROBLEM — D89.1 CRYOGLOBULINEMIA (HCC): Status: ACTIVE | Noted: 2022-11-15

## 2022-11-15 PROBLEM — D84.9 IMMUNOSUPPRESSION (HCC): Status: ACTIVE | Noted: 2022-11-15

## 2022-11-15 PROBLEM — N18.6 END-STAGE RENAL DISEASE (HCC): Status: ACTIVE | Noted: 2022-11-15

## 2022-11-15 RX ORDER — TRAZODONE HYDROCHLORIDE 100 MG/1
TABLET ORAL
COMMUNITY
Start: 2022-10-22

## 2022-11-15 NOTE — PATIENT INSTRUCTIONS
Medicare Preventive Visit Patient Instructions  Thank you for completing your Welcome to Medicare Visit or Medicare Annual Wellness Visit today  Your next wellness visit will be due in one year (11/16/2023)  The screening/preventive services that you may require over the next 5-10 years are detailed below  Some tests may not apply to you based off risk factors and/or age  Screening tests ordered at today's visit but not completed yet may show as past due  Also, please note that scanned in results may not display below  Preventive Screenings:  Service Recommendations Previous Testing/Comments   Colorectal Cancer Screening  Colonoscopy    Fecal Occult Blood Test (FOBT)/Fecal Immunochemical Test (FIT)  Fecal DNA/Cologuard Test  Flexible Sigmoidoscopy Age: 39-70 years old   Colonoscopy: every 10 years (May be performed more frequently if at higher risk)  OR  FOBT/FIT: every 1 year  OR  Cologuard: every 3 years  OR  Sigmoidoscopy: every 5 years  Screening may be recommended earlier than age 39 if at higher risk for colorectal cancer  Also, an individualized decision between you and your healthcare provider will decide whether screening between the ages of 74-80 would be appropriate   Colonoscopy: Not on file  FOBT/FIT: Not on file  Cologuard: Not on file  Sigmoidoscopy: Not on file          Prostate Cancer Screening Individualized decision between patient and health care provider in men between ages of 53-78   Medicare will cover every 12 months beginning on the day after your 50th birthday PSA: No results in last 5 years           Hepatitis C Screening Once for adults born between [birth year] and 1965  More frequently in patients at high risk for Hepatitis C Hep C Antibody: 04/06/2022        Diabetes Screening 1-2 times per year if you're at risk for diabetes or have pre-diabetes Fasting glucose: No results in last 5 years (No results in last 5 years)  A1C: No results in last 5 years (No results in last 5 years) Cholesterol Screening Once every 5 years if you don't have a lipid disorder  May order more often based on risk factors  Lipid panel: Not on file         Other Preventive Screenings Covered by Medicare:  Abdominal Aortic Aneurysm (AAA) Screening: covered once if your at risk  You're considered to be at risk if you have a family history of AAA or a male between the age of 73-68 who smoking at least 100 cigarettes in your lifetime  Lung Cancer Screening: covers low dose CT scan once per year if you meet all of the following conditions: (1) Age 50-69; (2) No signs or symptoms of lung cancer; (3) Current smoker or have quit smoking within the last 15 years; (4) You have a tobacco smoking history of at least 20 pack years (packs per day x number of years you smoked); (5) You get a written order from a healthcare provider  Glaucoma Screening: covered annually if you're considered high risk: (1) You have diabetes OR (2) Family history of glaucoma OR (3)  aged 48 and older OR (3)  American aged 72 and older  Osteoporosis Screening: covered every 2 years if you meet one of the following conditions: (1) Have a vertebral abnormality; (2) On glucocorticoid therapy for more than 3 months; (3) Have primary hyperparathyroidism; (4) On osteoporosis medications and need to assess response to drug therapy  HIV Screening: covered annually if you're between the age of 12-76  Also covered annually if you are younger than 13 and older than 72 with risk factors for HIV infection  For pregnant patients, it is covered up to 3 times per pregnancy      Immunizations:  Immunization Recommendations   Influenza Vaccine Annual influenza vaccination during flu season is recommended for all persons aged >= 6 months who do not have contraindications   Pneumococcal Vaccine   * Pneumococcal conjugate vaccine = PCV13 (Prevnar 13), PCV15 (Vaxneuvance), PCV20 (Prevnar 20)  * Pneumococcal polysaccharide vaccine = PPSV23 (Pneumovax) Adults 2364 years old: 1-3 doses may be recommended based on certain risk factors  Adults 72 years old: 1-2 doses may be recommended based off what pneumonia vaccine you previously received   Hepatitis B Vaccine 3 dose series if at intermediate or high risk (ex: diabetes, end stage renal disease, liver disease)   Tetanus (Td) Vaccine - COST NOT COVERED BY MEDICARE PART B Following completion of primary series, a booster dose should be given every 10 years to maintain immunity against tetanus  Td may also be given as tetanus wound prophylaxis  Tdap Vaccine - COST NOT COVERED BY MEDICARE PART B Recommended at least once for all adults  For pregnant patients, recommended with each pregnancy  Shingles Vaccine (Shingrix) - COST NOT COVERED BY MEDICARE PART B  2 shot series recommended in those aged 48 and above     Health Maintenance Due:      Topic Date Due    HIV Screening  Completed    Hepatitis C Screening  Completed     Immunizations Due:      Topic Date Due    COVID-19 Vaccine (1) Never done    Influenza Vaccine (1) 09/01/2022     Advance Directives   What are advance directives? Advance directives are legal documents that state your wishes and plans for medical care  These plans are made ahead of time in case you lose your ability to make decisions for yourself  Advance directives can apply to any medical decision, such as the treatments you want, and if you want to donate organs  What are the types of advance directives? There are many types of advance directives, and each state has rules about how to use them  You may choose a combination of any of the following:  Living will: This is a written record of the treatment you want  You can also choose which treatments you do not want, which to limit, and which to stop at a certain time  This includes surgery, medicine, IV fluid, and tube feedings  Durable power of  for healthcare [facility]):   This is a written record that states who you want to make healthcare choices for you when you are unable to make them for yourself  This person, called a proxy, is usually a family member or a friend  You may choose more than 1 proxy  Do not resuscitate (DNR) order:  A DNR order is used in case your heart stops beating or you stop breathing  It is a request not to have certain forms of treatment, such as CPR  A DNR order may be included in other types of advance directives  Medical directive: This covers the care that you want if you are in a coma, near death, or unable to make decisions for yourself  You can list the treatments you want for each condition  Treatment may include pain medicine, surgery, blood transfusions, dialysis, IV or tube feedings, and a ventilator (breathing machine)  Values history: This document has questions about your views, beliefs, and how you feel and think about life  This information can help others choose the care that you would choose  Why are advance directives important? An advance directive helps you control your care  Although spoken wishes may be used, it is better to have your wishes written down  Spoken wishes can be misunderstood, or not followed  Treatments may be given even if you do not want them  An advance directive may make it easier for your family to make difficult choices about your care  Weight Management   Why it is important to manage your weight:  Being overweight increases your risk of health conditions such as heart disease, high blood pressure, type 2 diabetes, and certain types of cancer  It can also increase your risk for osteoarthritis, sleep apnea, and other respiratory problems  Aim for a slow, steady weight loss  Even a small amount of weight loss can lower your risk of health problems  How to lose weight safely:  A safe and healthy way to lose weight is to eat fewer calories and get regular exercise   You can lose up about 1 pound a week by decreasing the number of calories you eat by 500 calories each day  Healthy meal plan for weight management:  A healthy meal plan includes a variety of foods, contains fewer calories, and helps you stay healthy  A healthy meal plan includes the following:  Eat whole-grain foods more often  A healthy meal plan should contain fiber  Fiber is the part of grains, fruits, and vegetables that is not broken down by your body  Whole-grain foods are healthy and provide extra fiber in your diet  Some examples of whole-grain foods are whole-wheat breads and pastas, oatmeal, brown rice, and bulgur  Eat a variety of vegetables every day  Include dark, leafy greens such as spinach, kale, perez greens, and mustard greens  Eat yellow and orange vegetables such as carrots, sweet potatoes, and winter squash  Eat a variety of fruits every day  Choose fresh or canned fruit (canned in its own juice or light syrup) instead of juice  Fruit juice has very little or no fiber  Eat low-fat dairy foods  Drink fat-free (skim) milk or 1% milk  Eat fat-free yogurt and low-fat cottage cheese  Try low-fat cheeses such as mozzarella and other reduced-fat cheeses  Choose meat and other protein foods that are low in fat  Choose beans or other legumes such as split peas or lentils  Choose fish, skinless poultry (chicken or turkey), or lean cuts of red meat (beef or pork)  Before you cook meat or poultry, cut off any visible fat  Use less fat and oil  Try baking foods instead of frying them  Add less fat, such as margarine, sour cream, regular salad dressing and mayonnaise to foods  Eat fewer high-fat foods  Some examples of high-fat foods include french fries, doughnuts, ice cream, and cakes  Eat fewer sweets  Limit foods and drinks that are high in sugar  This includes candy, cookies, regular soda, and sweetened drinks  Exercise:  Exercise at least 30 minutes per day on most days of the week  Some examples of exercise include walking, biking, dancing, and swimming   You can also fit in more physical activity by taking the stairs instead of the elevator or parking farther away from stores  Ask your healthcare provider about the best exercise plan for you  © Copyright [address] 2018 Information is for End User's use only and may not be sold, redistributed or otherwise used for commercial purposes  All illustrations and images included in CareNotes® are the copyrighted property of [employer]  or [address] is UTD and needs advanced directives and living will and home is safe, ADL's intact  [facility]  Take all meds as directed for gerd and sleep  Take BP meds as directed  Monitor weight and lose weight as directed to get BMI lower than 25  Flu shot is UTD and covid vaccines

## 2022-11-15 NOTE — PROGRESS NOTES
Assessment and Plan:     Problem List Items Addressed This Visit        Digestive    Gastroesophageal reflux disease       Other    Overweight (BMI 25 0-29  9)    Insomnia      Other Visit Diagnoses     Medicare annual wellness visit, subsequent    -  Primary    Health care maintenance        Benign hypertension        Relevant Orders    Comprehensive metabolic panel    Screening for hyperlipidemia        Relevant Orders    Lipid Panel with Direct LDL reflex    Comprehensive metabolic panel    Hyperlipidemia, unspecified hyperlipidemia type        Relevant Orders    Lipid Panel with Direct LDL reflex    Comprehensive metabolic panel           Preventive health issues were discussed with patient, and age appropriate screening tests were ordered as noted in patient's After Visit Summary  Personalized health advice and appropriate referrals for health education or preventive services given if needed, as noted in patient's After Visit Summary  History of Present Illness:     Patient presents for a Medicare Wellness Visit    Medicare Wellness Visit (Pt declines flu shot today ) Flu shot done last week at [address] is UTD and home is safe and ADLs intact  Quit tobacco 8 months ago after transplant for both kidneys  Sees Johnny every 30 days  Patient Care Team:  Brian Delgado DO as PCP - General (Family Medicine)  Naina Davila DO     Review of Systems:     Review of Systems   Constitutional: Negative  HENT: Negative  Eyes: Negative  Respiratory: Negative  Cardiovascular: Negative  Gastrointestinal: Negative  Endocrine: Negative  Genitourinary: Negative  Musculoskeletal: Negative  Skin: Negative  Allergic/Immunologic: Negative  Neurological: Negative  Hematological: Negative  Psychiatric/Behavioral: Negative  Problem List:     Patient Active Problem List   Diagnosis   • Overweight (BMI 25 0-29  9)   • Gastroesophageal reflux disease   • Insomnia • Immunosuppression ([facility] [address] )   • 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 [facility] )   • Cryoglobulinemia (HCC)      Past Medical and Surgical History:     Past Medical History:   Diagnosis Date   • Chronic kidney disease    • Hypertension 2000   • Kidney failure 2008     Past Surgical History:   Procedure Laterality Date   • DIALYSIS FISTULA CREATION  2020   • NEPHRECTOMY TRANSPLANTED ORGAN  06/2009   • NEPHRECTOMY TRANSPLANTED ORGAN  03/2022      Family History:     Family History   Problem Relation Age of Onset   • Breast cancer Mother    • Heart disease Father    • Heart disease Paternal Grandmother    • Throat cancer Paternal Grandfather    • Lung cancer Maternal Aunt       Social History:     Social History     Socioeconomic History   • Marital status: /Civil Union     Spouse name: None   • Number of children: None   • Years of education: None   • Highest education level: None   Occupational History   • None   Tobacco Use   • Smoking status: Never Smoker   • Smokeless tobacco: Former User   Vaping Use   • Vaping Use: Never used   Substance and Sexual Activity   • Alcohol use: Not Currently     Comment: socially    • Drug use: Never   • Sexual activity: None   Other Topics Concern   • None   Social History Narrative   • None     Social Determinants of Health     Financial Resource Strain: Unknown   • Difficulty of Paying Living Expenses: Patient refused   Food Insecurity: Not on file   Transportation Needs: No Transportation Needs   • Lack of Transportation (Medical): No   • Lack of Transportation (Non-Medical):  No   Physical Activity: Not on file   Stress: Not on file   Social Connections: Not on file   Intimate Partner Violence: Not on file   Housing Stability: Not on file      Medications and Allergies:     Current Outpatient Medications   Medication Sig Dispense Refill   • acetaminophen (TYLENOL) 325 mg tablet Take 650 mg by mouth     • Aspirin Adult Low Strength 81 MG EC tablet      • cinacalcet (SENSIPAR) 30 mg tablet Take 30 mg by mouth daily     • Envarsus XR 1 MG TB24      • Envarsus XR 4 MG TB24      • ketoconazole (NIZORAL) 2 % cream Apply to skin twice daily until rash is clear 60 g 5   • labetalol (NORMODYNE) 300 mg tablet take 1 tablet by mouth three times a day 90 tablet 3   • magnesium chloride-calcium (SLOW-MAG) 71 5-119 mg Take 143 mg by mouth 2 (two) times a day     • mycophenolate (CELLCEPT) 250 mg capsule      • NIFEdipine (PROCARDIA XL) 30 mg 24 hr tablet Take 1 tablet (30 mg total) by mouth daily 30 tablet 5   • omeprazole (PriLOSEC) 20 mg delayed release capsule take 1 capsule by mouth once daily 30 capsule 5   • predniSONE 5 mg tablet Take 10 mg by mouth daily     • sodium bicarbonate 650 mg tablet Take 1,300 mg by mouth Three times a day     • Sodium Zirconium Cyclosilicate 5 g PACK Take 5 g by mouth     • traZODone (DESYREL) 100 mg tablet TAKE 1 5 TABLETS BY MOUTH AT NIGHT FOR INSOMNIA  **MAY INCREASE T   (REFER TO PRESCRIPTION NOTES)  No current facility-administered medications for this visit  Allergies   Allergen Reactions   • Prednisone Other (See Comments) and Swelling     Dizzy, bloated, shakey  Edema with high doses   Reports as an intolerance        Immunizations:     Immunization History   Administered Date(s) Administered   • Hep B, adult 06/20/2018, 07/18/2018, 08/15/2018, 12/19/2018, 05/15/2019, 06/19/2019, 07/17/2019, 11/20/2019, 12/11/2019   • Influenza Quadrivalent Preservative Free 3 years and older IM 09/24/2021   • Meningococcal ACWY, unspecified 02/06/2017   • Meningococcal B, Recombinant (Juve Han) 03/22/2017   • Meningococcal MCV4P 02/06/2017   • Meningococcal, Unknown Serogroups 02/06/2017   • Pneumococcal Conjugate 13-Valent 05/23/2018   • Pneumococcal Polysaccharide PPV23 07/25/2018   • Tdap 08/14/2013      Health Maintenance:         Topic Date Due   • HIV Screening  Completed   • Hepatitis C Screening  Completed         Topic Date Due   • COVID-19 Vaccine (1) Never done • Influenza Vaccine (1) 09/01/2022      Medicare Screening Tests and Risk Assessments:     Evelyn Antonio is here for his Subsequent Wellness visit  Health Risk Assessment:   Patient rates overall health as very good  Patient feels that their physical health rating is much better  Patient is very satisfied with their life  Eyesight was rated as same  Hearing was rated as same  Patient feels that their emotional and mental health rating is much better  Patients states they are never, rarely angry  Patient states they are never, rarely unusually tired/fatigued  Pain experienced in the last 7 days has been none  Patient states that he has experienced no weight loss or gain in last 6 months  Fall Risk Screening: In the past year, patient has experienced: no history of falling in past year      Home Safety:  Patient does not have trouble with stairs inside or outside of their home  Patient has working smoke alarms and has no working carbon monoxide detector  Home safety hazards include: none  Nutrition:   Current diet is Regular  Medications:   Patient is not currently taking any over-the-counter supplements  Patient is able to manage medications  Activities of Daily Living (ADLs)/Instrumental Activities of Daily Living (IADLs):   Walk and transfer into and out of bed and chair?: Yes  Dress and groom yourself?: Yes    Bathe or shower yourself?: Yes    Feed yourself?  Yes  Do your laundry/housekeeping?: Yes  Manage your money, pay your bills and track your expenses?: Yes  Make your own meals?: Yes    Do your own shopping?: Yes    Previous Hospitalizations:   Any hospitalizations or ED visits within the last 12 months?: Yes    How many hospitalizations have you had in the last year?: 1-2    Advance Care Planning:   Living will: No    Durable POA for healthcare: No    Advanced directive: No      Comments: Needs advanced directives and living will     PREVENTIVE SCREENINGS        Prostate Cancer Screening: General: Screening Not Indicated      Lung Cancer Screening:     General: Screening Not Indicated      Hepatitis C Screening:    General: Screening Current    Screening, Brief Intervention, and Referral to Treatment (SBIRT)    Screening  Typical number of drinks in a day: 0  Typical number of drinks in a week: 0  Interpretation: Low risk drinking behavior  AUDIT-C Screenin) How often did you have a drink containing alcohol in the past year? never  2) How many drinks did you have on a typical day when you were drinking in the past year? 0  3) How often did you have 6 or more drinks on one occasion in the past year? never    AUDIT-C Score: 0  Interpretation: Score 0-3 (male): Negative screen for alcohol misuse    Single Item Drug Screening:  How often have you used an illegal drug (including marijuana) or a prescription medication for non-medical reasons in the past year? never    Single Item Drug Screen Score: 0  Interpretation: Negative screen for possible drug use disorder    No exam data present     Physical Exam:     /78 (BP Location: Left arm, Patient Position: Sitting, Cuff Size: Adult)   Pulse 81   Temp (!) 97 3 °F (36 3 °C) (Temporal)   Resp 16   Ht 5' 11" (1 803 m)   Wt 94 6 kg (208 lb 9 6 oz)   SpO2 99%   BMI 29 09 kg/m²     Physical Exam  Vitals and nursing note reviewed  Constitutional:       Appearance: He is well-developed  HENT:      Head: Normocephalic and atraumatic  Right Ear: Tympanic membrane normal       Left Ear: Tympanic membrane normal       Nose: Nose normal       Mouth/Throat:      Mouth: Mucous membranes are moist    Eyes:      Conjunctiva/sclera: Conjunctivae normal    Cardiovascular:      Rate and Rhythm: Normal rate and regular rhythm  Pulses: Normal pulses  Heart sounds: Normal heart sounds  No murmur heard  Pulmonary:      Effort: Pulmonary effort is normal  No respiratory distress  Breath sounds: Normal breath sounds     Abdominal: General: Abdomen is flat  Bowel sounds are normal       Palpations: Abdomen is soft  Tenderness: There is no abdominal tenderness  Musculoskeletal:         General: Normal range of motion  Cervical back: Normal range of motion and neck supple  Skin:     General: Skin is warm and dry  Capillary Refill: Capillary refill takes less than 2 seconds  Neurological:      General: No focal deficit present  Mental Status: He is alert and oriented to person, place, and time  Psychiatric:         Mood and Affect: Mood normal          Behavior: Behavior normal          Thought Content:  Thought content normal          Judgment: Judgment normal           Hemal Collins DO

## 2022-11-30 LAB
ALBUMIN SERPL-MCNC: 4 G/DL (ref 3.6–5.1)
ALBUMIN/GLOB SERPL: 1.8 (CALC) (ref 1–2.5)
ALP SERPL-CCNC: 92 U/L (ref 36–130)
ALT SERPL-CCNC: 10 U/L (ref 9–46)
AST SERPL-CCNC: 10 U/L (ref 10–40)
BILIRUB SERPL-MCNC: 0.7 MG/DL (ref 0.2–1.2)
BUN SERPL-MCNC: 22 MG/DL (ref 7–25)
BUN/CREAT SERPL: 16 (CALC) (ref 6–22)
CALCIUM SERPL-MCNC: 8.9 MG/DL (ref 8.6–10.3)
CHLORIDE SERPL-SCNC: 110 MMOL/L (ref 98–110)
CHOLEST SERPL-MCNC: 236 MG/DL
CHOLEST/HDLC SERPL: 4.1 (CALC)
CO2 SERPL-SCNC: 23 MMOL/L (ref 20–32)
CREAT SERPL-MCNC: 1.41 MG/DL (ref 0.6–1.29)
GFR/BSA.PRED SERPLBLD CYS-BASED-ARV: 63 ML/MIN/1.73M2
GLOBULIN SER CALC-MCNC: 2.2 G/DL (CALC) (ref 1.9–3.7)
GLUCOSE SERPL-MCNC: 94 MG/DL (ref 65–99)
HDLC SERPL-MCNC: 58 MG/DL
LDLC SERPL CALC-MCNC: 152 MG/DL (CALC)
NONHDLC SERPL-MCNC: 178 MG/DL (CALC)
POTASSIUM SERPL-SCNC: 4.5 MMOL/L (ref 3.5–5.3)
PROT SERPL-MCNC: 6.2 G/DL (ref 6.1–8.1)
SODIUM SERPL-SCNC: 141 MMOL/L (ref 135–146)
TRIGL SERPL-MCNC: 139 MG/DL

## 2023-01-14 DIAGNOSIS — N18.30 CKD (CHRONIC KIDNEY DISEASE) STAGE 3, GFR 30-59 ML/MIN (HCC): ICD-10-CM

## 2023-01-16 DIAGNOSIS — G47.00 INSOMNIA, UNSPECIFIED TYPE: Primary | ICD-10-CM

## 2023-01-16 RX ORDER — OMEPRAZOLE 20 MG/1
20 CAPSULE, DELAYED RELEASE ORAL DAILY
Qty: 30 CAPSULE | Refills: 0 | Status: SHIPPED | OUTPATIENT
Start: 2023-01-16

## 2023-01-16 RX ORDER — TRAZODONE HYDROCHLORIDE 100 MG/1
TABLET ORAL
Qty: 30 TABLET | Refills: 0 | Status: SHIPPED | OUTPATIENT
Start: 2023-01-16

## 2023-01-18 ENCOUNTER — TELEPHONE (OUTPATIENT)
Dept: FAMILY MEDICINE CLINIC | Facility: CLINIC | Age: 43
End: 2023-01-18

## 2023-01-18 NOTE — TELEPHONE ENCOUNTER
I called and spoke with the Pt and made him aware, he verbalized understanding and agreement  He will come in tomorrow morning for Menquadfi and 1 st trumenba  Second dose of Trumenba to be given in 6 months

## 2023-01-18 NOTE — TELEPHONE ENCOUNTER
----- Message from Carlin Castleman, DO sent at 1/18/2023  3:24 PM EST -----  Regarding: FW: Vaccine   Contact: [phone]  Menquadfi is fine to replace menactra single dose  ----- Message -----  From: Vee Garcia  Sent: 1/18/2023   3:18 PM EST  To: Carlin Castleman, DO  Subject: FW: Vaccine                                      Sorry we have [facility] not [facility]  ----- Message -----  From: Carlin Castleman, DO  Sent: 1/18/2023   3:05 PM EST  To: Vee Garcia  Subject: FW: Vaccine                                      Ok to start Jessenia Miracle 1 dose now and trumenba as directed 1st dose of trumenba now and then 6 months for 2nd dose of Trumenba  ----- Message -----  From: Vee Garcia  Sent: 1/18/2023   1:57 PM EST  To: Carlin Castleman, DO  Subject: FW: Vaccine                                      We have Menactra and trumenba   ----- Message -----  From: Carlin Castleman, DO  Sent: 1/18/2023   1:46 PM EST  To: Vee Garcia  Subject: FW: Vaccine                                      Can give 2 doses of menactra and either 2 doses of bexsero 1 month apart or 3 doses of Trumenba 0,1,6 months, which one is available here?   ----- Message -----  From: Ange Low  Sent: 1/18/2023   1:18 PM EST  To: Carlin Castleman, DO  Subject: FW: Vaccine                                        ----- Message -----  From: Marisol Briceno  Sent: 1/18/2023   1:17 PM EST  To: [facility]  Subject: Vaccine                                          Good afternoon Dr Dede Avalos transplant need me to do 2 vaccines Bexsero and menactra or menveo and I would like to know is your office have this and how soon I can get them     Thank you   Jonah Nassar

## 2023-01-19 ENCOUNTER — CLINICAL SUPPORT (OUTPATIENT)
Dept: FAMILY MEDICINE CLINIC | Facility: CLINIC | Age: 43
End: 2023-01-19

## 2023-01-19 DIAGNOSIS — Z23 ENCOUNTER FOR IMMUNIZATION: Primary | ICD-10-CM

## 2023-01-19 NOTE — TELEPHONE ENCOUNTER
I did call Hemanth Wayne and left a detailed message consent ok  I was inquiring if pt would like to schedule the 2nd dose that is due in 6 months

## 2023-01-20 NOTE — TELEPHONE ENCOUNTER
I called Ledy Johnson he will call us back to schedule  He has to follow up with his specialists as they may request next dose sooner   Pt will let us know what they advise so we may have Dr Sprague review for approval

## 2023-02-06 ENCOUNTER — TELEPHONE (OUTPATIENT)
Dept: FAMILY MEDICINE CLINIC | Facility: CLINIC | Age: 43
End: 2023-02-06

## 2023-02-06 DIAGNOSIS — G47.00 INSOMNIA, UNSPECIFIED TYPE: ICD-10-CM

## 2023-02-06 RX ORDER — TRAZODONE HYDROCHLORIDE 100 MG/1
TABLET ORAL
Qty: 30 TABLET | Refills: 0 | Status: SHIPPED | OUTPATIENT
Start: 2023-02-06

## 2023-02-06 NOTE — TELEPHONE ENCOUNTER
I called and spoke with the Pt's wife and made her aware  I also placed a call out to Pt's Nephrologist Dr Leon Azul and asked them to route a call out to her to make her aware of the immunizations the Pt received and when his next immunization is scheduled and to see if there is anything further she would like us to do  I am awaiting a return call

## 2023-02-06 NOTE — TELEPHONE ENCOUNTER
Patient's wife Russ Marte called stating patient needs his Meningococcal in 2 months after his first vaccine  Is this correct? Please advise Nathaly at [phone]

## 2023-02-07 NOTE — TELEPHONE ENCOUNTER
Luz Ortiz a pharmacist with [facility] called and stated the Asim Peterson is a 3 dose series and the Pt should get his second one in March and his 3rd at 6 months  He needs to have his second one in March as he has an upcoming transplant surgery scheduled in March as well  If we have any further questions we can reach out to her directly her number is [phone]  Please advise, thank you

## 2023-03-05 DIAGNOSIS — G47.00 INSOMNIA, UNSPECIFIED TYPE: ICD-10-CM

## 2023-03-06 RX ORDER — TRAZODONE HYDROCHLORIDE 100 MG/1
TABLET ORAL
Qty: 30 TABLET | Refills: 0 | Status: SHIPPED | OUTPATIENT
Start: 2023-03-06

## 2023-03-23 DIAGNOSIS — G47.00 INSOMNIA, UNSPECIFIED TYPE: ICD-10-CM

## 2023-03-23 RX ORDER — TRAZODONE HYDROCHLORIDE 100 MG/1
TABLET ORAL
Qty: 30 TABLET | Refills: 0 | Status: SHIPPED | OUTPATIENT
Start: 2023-03-23

## 2023-04-24 DIAGNOSIS — G47.00 INSOMNIA, UNSPECIFIED TYPE: ICD-10-CM

## 2023-04-24 RX ORDER — TRAZODONE HYDROCHLORIDE 100 MG/1
TABLET ORAL
Qty: 30 TABLET | Refills: 0 | Status: SHIPPED | OUTPATIENT
Start: 2023-04-24

## 2023-05-11 ENCOUNTER — OFFICE VISIT (OUTPATIENT)
Dept: FAMILY MEDICINE CLINIC | Facility: CLINIC | Age: 43
End: 2023-05-11

## 2023-05-11 VITALS
DIASTOLIC BLOOD PRESSURE: 84 MMHG | RESPIRATION RATE: 16 BRPM | SYSTOLIC BLOOD PRESSURE: 128 MMHG | WEIGHT: 212.6 LBS | HEART RATE: 84 BPM | TEMPERATURE: 96.3 F | HEIGHT: 72 IN | OXYGEN SATURATION: 97 % | BODY MASS INDEX: 28.79 KG/M2

## 2023-05-11 DIAGNOSIS — J02.9 SORE THROAT: Primary | ICD-10-CM

## 2023-05-11 DIAGNOSIS — I10 HYPERTENSION, UNSPECIFIED TYPE: ICD-10-CM

## 2023-05-11 DIAGNOSIS — H66.91 RIGHT OTITIS MEDIA, UNSPECIFIED OTITIS MEDIA TYPE: ICD-10-CM

## 2023-05-11 DIAGNOSIS — H92.03 OTALGIA OF BOTH EARS: ICD-10-CM

## 2023-05-11 DIAGNOSIS — N18.6 END-STAGE RENAL DISEASE (HCC): ICD-10-CM

## 2023-05-11 DIAGNOSIS — D84.9 IMMUNOSUPPRESSION (HCC): ICD-10-CM

## 2023-05-11 RX ORDER — LOSARTAN POTASSIUM 50 MG/1
TABLET ORAL
COMMUNITY
Start: 2023-03-13

## 2023-05-11 RX ORDER — SULFAMETHOXAZOLE AND TRIMETHOPRIM 400; 80 MG/1; MG/1
TABLET ORAL
COMMUNITY
Start: 2023-04-03

## 2023-05-11 RX ORDER — AZITHROMYCIN 250 MG/1
TABLET, FILM COATED ORAL
Qty: 6 TABLET | Refills: 0 | Status: SHIPPED | OUTPATIENT
Start: 2023-05-11 | End: 2023-05-16

## 2023-05-11 RX ORDER — ERGOCALCIFEROL 1.25 MG/1
CAPSULE ORAL
COMMUNITY
Start: 2023-03-08

## 2023-05-11 RX ORDER — LOSARTAN POTASSIUM 25 MG/1
50 TABLET ORAL DAILY
COMMUNITY
Start: 2023-04-04

## 2023-05-11 NOTE — PATIENT INSTRUCTIONS
Start abx and take with food and rec gargles TID and take all current meds as directed  Call if worse  Patient was recently in [place]

## 2023-05-11 NOTE — PROGRESS NOTES
Name: Trevor Johnson      : 1980      MRN: [id number]  Encounter Provider: Damian Oconnor DO  Encounter Date: 2023   Encounter department: [address] [place] [address]  Chief Complaint   Patient presents with   • Earache     B/L ear pain but R is worse  since Tuesday the pain comes and goes    • Sore Throat     Sore throat and congestion since Tuesday pt took a home covid test it was negative      Patient Instructions   Start abx and take with food and rec gargles TID and take all current meds as directed  Assessment & Plan     1  Sore throat  -     azithromycin (Zithromax) 250 mg tablet; Take 2 tablets (500 mg total) by mouth daily for 1 day, THEN 1 tablet (250 mg total) daily for 4 days  2  Otalgia of both ears  -     azithromycin (Zithromax) 250 mg tablet; Take 2 tablets (500 mg total) by mouth daily for 1 day, THEN 1 tablet (250 mg total) daily for 4 days  3  Immunosuppression ([address] )    4  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 ([facility] [address] )    5  Hypertension, unspecified type    6  Right otitis media, unspecified otitis media type           Subjective      Earache (B/L ear pain but R is worse  since Tuesday the pain comes and goes )  Sore Throat (Sore throat and congestion since Tuesday pt took a home covid test it was negative )      Earache   Associated symptoms include a sore throat  Sore Throat   Associated symptoms include ear pain  Review of Systems   Constitutional: Negative  HENT: Positive for ear pain and sore throat  Eyes: Negative  Respiratory: Negative  Cardiovascular: Negative  Gastrointestinal: Negative  Endocrine: Negative  Genitourinary: Negative  Musculoskeletal: Negative  Skin: Negative  Allergic/Immunologic: Negative  Neurological: Negative  Hematological: Negative  Psychiatric/Behavioral: Negative          Current Outpatient Medications on File Prior to Visit   Medication Sig   • acetaminophen (TYLENOL) 325 mg tablet Take 650 mg by mouth "  • Envarsus XR 1 MG TB24    • Envarsus XR 4 MG TB24    • ergocalciferol (VITAMIN D2) 50,000 units take 1 capsule by mouth EVERY 30 DAYS   • labetalol (NORMODYNE) 300 mg tablet take 1 tablet by mouth three times a day   • losartan (COZAAR) 25 mg tablet Take 50 mg by mouth daily   • losartan (COZAAR) 50 mg tablet    • magnesium chloride-calcium (SLOW-MAG) 71 5-119 mg Take 143 mg by mouth 2 (two) times a day   • mycophenolate (CELLCEPT) 250 mg capsule    • omeprazole (PriLOSEC) 20 mg delayed release capsule Take 1 capsule (20 mg total) by mouth daily   • predniSONE 5 mg tablet Take 10 mg by mouth daily   • sodium bicarbonate 650 mg tablet Take 1,300 mg by mouth Three times a day   • Sodium Zirconium Cyclosilicate 5 g PACK Take 5 g by mouth   • sulfamethoxazole-trimethoprim (BACTRIM) 400-80 mg per tablet    • traZODone (DESYREL) 100 mg tablet Take 1 5 tablets po qhs prn sleep   • [DISCONTINUED] Aspirin Adult Low Strength 81 MG EC tablet    • [DISCONTINUED] cinacalcet (SENSIPAR) 30 mg tablet Take 30 mg by mouth daily   • [DISCONTINUED] ketoconazole (NIZORAL) 2 % cream Apply to skin twice daily until rash is clear   • [DISCONTINUED] NIFEdipine (PROCARDIA XL) 30 mg 24 hr tablet Take 1 tablet (30 mg total) by mouth daily       Objective     /84 (BP Location: Left arm, Patient Position: Sitting, Cuff Size: Large)   Pulse 84   Temp (!) 96 3 °F (35 7 °C) (Temporal)   Resp 16   Ht 5' 11 5\" (1 816 m)   Wt 96 4 kg (212 lb 9 6 oz)   SpO2 97%   BMI 29 24 kg/m²     Physical Exam  Constitutional:       Appearance: He is well-developed  HENT:      Head: Normocephalic and atraumatic  Right Ear: Tympanic membrane, ear canal and external ear normal       Left Ear: Tympanic membrane, ear canal and external ear normal       Nose: Nose normal       Mouth/Throat:      Mouth: Mucous membranes are pale and dry     Eyes:      Conjunctiva/sclera: Conjunctivae normal       Pupils: Pupils are equal, round, and reactive to " light  Cardiovascular:      Rate and Rhythm: Normal rate and regular rhythm  Heart sounds: Normal heart sounds  Pulmonary:      Effort: Pulmonary effort is normal       Breath sounds: Normal breath sounds  Musculoskeletal:         General: Normal range of motion  Cervical back: Normal range of motion and neck supple  Skin:     General: Skin is warm and dry  Capillary Refill: Capillary refill takes less than 2 seconds  Neurological:      General: No focal deficit present  Mental Status: He is alert and oriented to person, place, and time  Deep Tendon Reflexes: Reflexes are normal and symmetric     Psychiatric:         Mood and Affect: Mood normal          Behavior: Behavior normal        Marian Alfredo DO

## 2023-05-31 ENCOUNTER — TELEPHONE (OUTPATIENT)
Dept: FAMILY MEDICINE CLINIC | Facility: CLINIC | Age: 43
End: 2023-05-31

## 2023-05-31 NOTE — TELEPHONE ENCOUNTER
Patient called to schedule his 3rd Trumenba shot  His last one was 4/19/23  How long in between those doses?

## 2023-07-19 ENCOUNTER — CLINICAL SUPPORT (OUTPATIENT)
Dept: FAMILY MEDICINE CLINIC | Facility: CLINIC | Age: 43
End: 2023-07-19
Payer: MEDICARE

## 2023-07-19 DIAGNOSIS — Z23 ENCOUNTER FOR IMMUNIZATION: Primary | ICD-10-CM

## 2023-07-19 PROCEDURE — 90471 IMMUNIZATION ADMIN: CPT

## 2023-07-19 PROCEDURE — 90621 MENB-FHBP VACC 2/3 DOSE IM: CPT

## 2023-07-20 ENCOUNTER — OFFICE VISIT (OUTPATIENT)
Dept: FAMILY MEDICINE CLINIC | Facility: CLINIC | Age: 43
End: 2023-07-20
Payer: MEDICARE

## 2023-07-20 VITALS
HEART RATE: 87 BPM | TEMPERATURE: 98.7 F | OXYGEN SATURATION: 99 % | SYSTOLIC BLOOD PRESSURE: 130 MMHG | DIASTOLIC BLOOD PRESSURE: 82 MMHG | BODY MASS INDEX: 28.04 KG/M2 | WEIGHT: 207 LBS | HEIGHT: 72 IN

## 2023-07-20 DIAGNOSIS — D89.1 CRYOGLOBULINEMIA (HCC): ICD-10-CM

## 2023-07-20 DIAGNOSIS — J02.9 SORE THROAT: Primary | ICD-10-CM

## 2023-07-20 DIAGNOSIS — Z94.0 HISTORY OF KIDNEY TRANSPLANT: ICD-10-CM

## 2023-07-20 DIAGNOSIS — G47.00 INSOMNIA, UNSPECIFIED TYPE: ICD-10-CM

## 2023-07-20 DIAGNOSIS — I10 HYPERTENSION, UNSPECIFIED TYPE: ICD-10-CM

## 2023-07-20 DIAGNOSIS — K21.9 GASTROESOPHAGEAL REFLUX DISEASE, UNSPECIFIED WHETHER ESOPHAGITIS PRESENT: ICD-10-CM

## 2023-07-20 DIAGNOSIS — D84.9 IMMUNOSUPPRESSION (HCC): ICD-10-CM

## 2023-07-20 PROCEDURE — 99214 OFFICE O/P EST MOD 30 MIN: CPT | Performed by: FAMILY MEDICINE

## 2023-07-20 RX ORDER — AZITHROMYCIN 250 MG/1
TABLET, FILM COATED ORAL
Qty: 6 TABLET | Refills: 0 | Status: SHIPPED | OUTPATIENT
Start: 2023-07-20 | End: 2023-07-24

## 2023-07-20 NOTE — PROGRESS NOTES
Name: Cale Berumen      : 1980      MRN: [id number]  Encounter Provider: Paloma Ortiz MD  Encounter Date: 2023   Encounter department: [address] [place] [address]     With history of immunosuppression, will treat patient with azithromycin. Increase p.o. hydration. Blood pressure acceptable 130/82, continue blood pressure medications as prescribed. Follow-up with nephrology for history of kidney disease. Doing well with his Prilosec for his GERD. Using trazodone as needed for insomnia. RTC as needed    1. Sore throat  -     azithromycin (ZITHROMAX) 250 mg tablet; Take 2 tablets today then 1 tablet daily x 4 days    2. 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 Hypertension, unspecified type    4. Immunosuppression ([address])    5. Gastroesophageal reflux disease, unspecified whether esophagitis present    6. Insomnia, unspecified type    7. History of kidney transplant           Subjective      Presents today with a 2-day history of sore throat body aches. No concern for COVID as per patient. Doing well with his medications. Follows up with nephrology for history of kidney transplant x2. Review of Systems   Constitutional: Negative for activity change, appetite change, chills, fatigue and fever. HENT: Positive for sore throat. Negative for congestion, rhinorrhea and sneezing. Eyes: Negative for pain, discharge, redness and itching. Respiratory: Negative for cough, chest tightness, shortness of breath and wheezing. Cardiovascular: Negative for chest pain and palpitations. Gastrointestinal: Negative for abdominal pain, constipation, diarrhea, nausea and vomiting. Musculoskeletal: Negative for arthralgias, gait problem, myalgias and neck pain. Skin: Negative for rash. Neurological: Negative for dizziness, weakness, numbness and headaches. Hematological: Negative for adenopathy. Psychiatric/Behavioral: Negative for dysphoric mood.      Chief Complaint   Patient presents with   • Cold Like Symptoms     Sore throat and body aches x 1 day   Patient has hx of kidney transplant and wife concerned as he just received vaccines yesterday      Current Outpatient Medications on File Prior to Visit   Medication Sig   • acetaminophen (TYLENOL) 325 mg tablet Take 650 mg by mouth   • Envarsus XR 1 MG TB24    • Envarsus XR 4 MG TB24    • ergocalciferol (VITAMIN D2) 50,000 units take 1 capsule by mouth EVERY 30 DAYS   • labetalol (NORMODYNE) 300 mg tablet take 1 tablet by mouth three times a day   • losartan (COZAAR) 25 mg tablet Take 50 mg by mouth daily   • losartan (COZAAR) 50 mg tablet    • magnesium chloride-calcium (SLOW-MAG) 71.5-119 mg Take 143 mg by mouth 2 (two) times a day   • mycophenolate (CELLCEPT) 250 mg capsule    • omeprazole (PriLOSEC) 20 mg delayed release capsule Take 1 capsule (20 mg total) by mouth daily   • predniSONE 5 mg tablet Take 10 mg by mouth daily   • sodium bicarbonate 650 mg tablet Take 1,300 mg by mouth Three times a day   • Sodium Zirconium Cyclosilicate 5 g PACK Take 5 g by mouth   • sulfamethoxazole-trimethoprim (BACTRIM) 400-80 mg per tablet    • traZODone (DESYREL) 100 mg tablet Take 1.5 tablets po qhs prn sleep       Objective     /82 (BP Location: Left arm, Patient Position: Sitting, Cuff Size: Large)   Pulse 87   Temp 98.7 °F (37.1 °C) (Temporal)   Ht 5' 11.5" (1.816 m)   Wt 93.9 kg (207 lb)   SpO2 99%   BMI 28.47 kg/m²     Physical Exam  Vitals reviewed. Constitutional:       General: He is not in acute distress. Appearance: Normal appearance. He is well-developed. He is not toxic-appearing or diaphoretic. HENT:      Head: Normocephalic and atraumatic. Right Ear: External ear normal.      Left Ear: External ear normal.      Nose: Nose normal.      Mouth/Throat:      Mouth: Mucous membranes are moist.      Pharynx: Posterior oropharyngeal erythema present. No oropharyngeal exudate. Eyes:      General: No scleral icterus. Right eye: No discharge. Left eye: No discharge. Conjunctiva/sclera: Conjunctivae normal.   Cardiovascular:      Rate and Rhythm: Normal rate and regular rhythm. Pulses: Normal pulses. Heart sounds: Normal heart sounds. No murmur heard. Pulmonary:      Effort: Pulmonary effort is normal. No respiratory distress. Breath sounds: Normal breath sounds. No wheezing. Abdominal:      General: Abdomen is flat. There is no distension. Palpations: Abdomen is soft. There is no mass. Tenderness: There is no abdominal tenderness. Hernia: No hernia is present. Musculoskeletal:         General: No tenderness. Normal range of motion. Cervical back: Normal range of motion. Skin:     General: Skin is warm. Findings: No erythema or rash. Neurological:      Mental Status: He is alert.    Psychiatric:         Mood and Affect: Mood normal.         Behavior: Behavior normal.       Yoana Hudson MD

## 2023-08-04 DIAGNOSIS — G47.00 INSOMNIA, UNSPECIFIED TYPE: ICD-10-CM

## 2023-08-04 RX ORDER — TRAZODONE HYDROCHLORIDE 100 MG/1
TABLET ORAL
Qty: 30 TABLET | Refills: 0 | Status: SHIPPED | OUTPATIENT
Start: 2023-08-04

## 2023-08-29 DIAGNOSIS — G47.00 INSOMNIA, UNSPECIFIED TYPE: ICD-10-CM

## 2023-08-29 RX ORDER — TRAZODONE HYDROCHLORIDE 100 MG/1
TABLET ORAL
Qty: 30 TABLET | Refills: 0 | Status: SHIPPED | OUTPATIENT
Start: 2023-08-29

## 2023-10-02 DIAGNOSIS — G47.00 INSOMNIA, UNSPECIFIED TYPE: ICD-10-CM

## 2023-10-02 RX ORDER — TRAZODONE HYDROCHLORIDE 100 MG/1
TABLET ORAL
Qty: 30 TABLET | Refills: 0 | Status: SHIPPED | OUTPATIENT
Start: 2023-10-02

## 2023-10-03 ENCOUNTER — CONSULT (OUTPATIENT)
Dept: FAMILY MEDICINE CLINIC | Facility: CLINIC | Age: 43
End: 2023-10-03
Payer: MEDICARE

## 2023-10-03 VITALS
SYSTOLIC BLOOD PRESSURE: 138 MMHG | WEIGHT: 208.2 LBS | DIASTOLIC BLOOD PRESSURE: 86 MMHG | HEIGHT: 72 IN | HEART RATE: 71 BPM | TEMPERATURE: 97.4 F | OXYGEN SATURATION: 99 % | BODY MASS INDEX: 28.2 KG/M2

## 2023-10-03 DIAGNOSIS — G47.00 INSOMNIA, UNSPECIFIED TYPE: ICD-10-CM

## 2023-10-03 DIAGNOSIS — H26.9 CATARACT OF BOTH EYES, UNSPECIFIED CATARACT TYPE: ICD-10-CM

## 2023-10-03 DIAGNOSIS — N18.6 END-STAGE RENAL DISEASE (HCC): ICD-10-CM

## 2023-10-03 DIAGNOSIS — Z01.818 PREOP EXAMINATION: Primary | ICD-10-CM

## 2023-10-03 DIAGNOSIS — Z94.0 HISTORY OF KIDNEY TRANSPLANT: ICD-10-CM

## 2023-10-03 DIAGNOSIS — D84.9 IMMUNOSUPPRESSION (HCC): ICD-10-CM

## 2023-10-03 DIAGNOSIS — I10 HYPERTENSION, UNSPECIFIED TYPE: ICD-10-CM

## 2023-10-03 DIAGNOSIS — K21.9 GASTROESOPHAGEAL REFLUX DISEASE, UNSPECIFIED WHETHER ESOPHAGITIS PRESENT: ICD-10-CM

## 2023-10-03 DIAGNOSIS — E66.3 OVERWEIGHT (BMI 25.0-29.9): ICD-10-CM

## 2023-10-03 DIAGNOSIS — D64.9 ANEMIA, UNSPECIFIED TYPE: ICD-10-CM

## 2023-10-03 DIAGNOSIS — D89.1 CRYOGLOBULINEMIA (HCC): ICD-10-CM

## 2023-10-03 PROCEDURE — 99213 OFFICE O/P EST LOW 20 MIN: CPT | Performed by: FAMILY MEDICINE

## 2023-10-03 RX ORDER — PATIROMER 8.4 G/1
POWDER, FOR SUSPENSION ORAL
COMMUNITY
Start: 2023-08-31

## 2023-10-03 RX ORDER — SPIRONOLACTONE 25 MG/1
25 TABLET ORAL DAILY
COMMUNITY
Start: 2023-08-16

## 2023-10-03 NOTE — PATIENT INSTRUCTIONS
Patient is medically cleared for surgery by Dr. Popeye Weir at [facility] eye surgical center for cataract surgery scheduled on 10/11/23 right eye and left eye cataract surgery 11/8/23.

## 2023-10-03 NOTE — PROGRESS NOTES
Name: Fifi Spain      : 1980      MRN: [id number]  Encounter Provider: Kaelyn Arreguin DO  Encounter Date: 10/3/2023   Encounter department: [address] [place] [address]  Chief Complaint   Patient presents with   • Pre-op Exam     Patient here for pre op clearance for right cataract surgery on 10/11/23 by Dr. Adolfo Moyer at [facility] under MAC/Topical anesthesia. Patient Instructions   Patient is medically cleared for surgery by Dr. Adolfo Moyer at [facility] eye surgical center for cataract surgery scheduled on 10/11/23 right eye and left eye cataract surgery 23. Assessment & Plan     1. Preop examination    2. Overweight (BMI 25.0-29.9)    3. Insomnia, unspecified type    4. Immunosuppression ([address])  Comments:  stable and sees nephrology    5. Hypertension, unspecified type    6. History of kidney transplant    7. Gastroesophageal reflux disease, unspecified whether esophagitis present    8. 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 ([address])  Comments:  stable    9. 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 Anemia, unspecified type    11. Cataract of both eyes, unspecified cataract type           Subjective      Pre-op Exam (Patient here for pre op clearance for right cataract surgery on 10/11/23 by Dr. Adolfo Moyer at [facility] under MAC/Topical anesthesia.) left eye cataract sx planned 23. Non smoker and on no blood thinners. Hx of HTN. Hx of end stage renal disease. Patient on immunosuppressive meds. Hx of kidney transplant 3/29/22. Had labs done yesterday and GFR is 60. CBC stable. Review of Systems   Constitutional: Negative. HENT: Negative. Eyes: Negative. Respiratory: Negative. Cardiovascular: Negative. Gastrointestinal: Negative. Endocrine: Negative. Genitourinary: Negative. Musculoskeletal: Negative. Skin: Negative. Allergic/Immunologic: Negative. Neurological: Negative. Hematological: Negative.     Psychiatric/Behavioral: Negative. Current Outpatient Medications on File Prior to Visit   Medication Sig   • acetaminophen (TYLENOL) 325 mg tablet Take 650 mg by mouth   • Envarsus XR 1 MG TB24    • Envarsus XR 4 MG TB24    • ergocalciferol (VITAMIN D2) 50,000 units take 1 capsule by mouth EVERY 30 DAYS   • labetalol (NORMODYNE) 300 mg tablet take 1 tablet by mouth three times a day (Patient taking differently: Take 300 mg by mouth 2 (two) times a day)   • losartan (COZAAR) 50 mg tablet    • magnesium chloride-calcium (SLOW-MAG) 71.5-119 mg Take 143 mg by mouth 2 (two) times a day   • mycophenolate (CELLCEPT) 250 mg capsule    • omeprazole (PriLOSEC) 20 mg delayed release capsule Take 1 capsule (20 mg total) by mouth daily   • predniSONE 5 mg tablet Take 5 mg by mouth daily   • sodium bicarbonate 650 mg tablet Take 1,300 mg by mouth Three times a day   • Sodium Zirconium Cyclosilicate 5 g PACK Take 5 g by mouth   • spironolactone (ALDACTONE) 25 mg tablet Take 25 mg by mouth daily   • traZODone (DESYREL) 100 mg tablet take 1 AND 1/2 tablets by mouth at bedtime if needed for sleep   • Veltassa 8.4 g PACK    • losartan (COZAAR) 25 mg tablet Take 50 mg by mouth daily (Patient not taking: Reported on 10/3/2023)   • sulfamethoxazole-trimethoprim (BACTRIM) 400-80 mg per tablet  (Patient not taking: Reported on 10/3/2023)       Objective     /86 (BP Location: Left arm, Patient Position: Sitting, Cuff Size: Standard)   Pulse 71   Temp (!) 97.4 °F (36.3 °C) (Temporal)   Ht 5' 11.5" (1.816 m)   Wt 94.4 kg (208 lb 3.2 oz)   SpO2 99%   BMI 28.63 kg/m²     Physical Exam  Constitutional:       Appearance: He is well-developed. Comments: overweight   HENT:      Head: Normocephalic and atraumatic.       Right Ear: Tympanic membrane, ear canal and external ear normal.      Left Ear: Tympanic membrane, ear canal and external ear normal.      Nose: Nose normal.      Mouth/Throat:      Mouth: Mucous membranes are moist.   Eyes: Conjunctiva/sclera: Conjunctivae normal.      Pupils: Pupils are equal, round, and reactive to light. Cardiovascular:      Rate and Rhythm: Normal rate and regular rhythm. Pulses: Normal pulses. Heart sounds: Normal heart sounds. Pulmonary:      Effort: Pulmonary effort is normal.      Breath sounds: Normal breath sounds. Abdominal:      General: Abdomen is flat. Bowel sounds are normal.      Palpations: Abdomen is soft. Musculoskeletal:         General: Normal range of motion. Cervical back: Normal range of motion and neck supple. Skin:     General: Skin is warm and dry. Capillary Refill: Capillary refill takes less than 2 seconds. Neurological:      General: No focal deficit present. Mental Status: He is alert and oriented to person, place, and time. Mental status is at baseline. Deep Tendon Reflexes: Reflexes are normal and symmetric. Psychiatric:         Mood and Affect: Mood normal.         Behavior: Behavior normal.         Thought Content:  Thought content normal.         Judgment: Judgment normal.       Bubba Munoz,

## 2023-10-18 ENCOUNTER — RA CDI HCC (OUTPATIENT)
Dept: OTHER | Facility: HOSPITAL | Age: 43
End: 2023-10-18

## 2023-10-18 NOTE — TELEPHONE ENCOUNTER
I called and spoke with the Pt's wife, he is scheduled for his second Irene in March  She stated he needs these for the Trial therapy he is doing, he previously had a transplant  Pt's mom called she said that her daughter is constipated and has not had a BM since last week Monday October 9th.  She has no other symptoms per mom. Please call to advise.

## 2023-10-24 ENCOUNTER — CONSULT (OUTPATIENT)
Dept: FAMILY MEDICINE CLINIC | Facility: CLINIC | Age: 43
End: 2023-10-24
Payer: MEDICARE

## 2023-10-24 VITALS
BODY MASS INDEX: 29.6 KG/M2 | RESPIRATION RATE: 16 BRPM | HEART RATE: 73 BPM | OXYGEN SATURATION: 98 % | HEIGHT: 70 IN | WEIGHT: 206.8 LBS | SYSTOLIC BLOOD PRESSURE: 128 MMHG | DIASTOLIC BLOOD PRESSURE: 60 MMHG | TEMPERATURE: 97.1 F

## 2023-10-24 DIAGNOSIS — D64.9 ANEMIA, UNSPECIFIED TYPE: ICD-10-CM

## 2023-10-24 DIAGNOSIS — E66.3 OVERWEIGHT (BMI 25.0-29.9): ICD-10-CM

## 2023-10-24 DIAGNOSIS — D89.1 CRYOGLOBULINEMIA (HCC): ICD-10-CM

## 2023-10-24 DIAGNOSIS — G47.00 INSOMNIA, UNSPECIFIED TYPE: ICD-10-CM

## 2023-10-24 DIAGNOSIS — N18.6 END-STAGE RENAL DISEASE (HCC): ICD-10-CM

## 2023-10-24 DIAGNOSIS — I10 HYPERTENSION, UNSPECIFIED TYPE: ICD-10-CM

## 2023-10-24 DIAGNOSIS — K21.9 GASTROESOPHAGEAL REFLUX DISEASE, UNSPECIFIED WHETHER ESOPHAGITIS PRESENT: ICD-10-CM

## 2023-10-24 DIAGNOSIS — Z01.818 PREOP EXAMINATION: Primary | ICD-10-CM

## 2023-10-24 DIAGNOSIS — Z94.0 HISTORY OF KIDNEY TRANSPLANT: ICD-10-CM

## 2023-10-24 DIAGNOSIS — H26.9 CATARACT OF LEFT EYE, UNSPECIFIED CATARACT TYPE: ICD-10-CM

## 2023-10-24 DIAGNOSIS — D84.9 IMMUNOSUPPRESSION (HCC): ICD-10-CM

## 2023-10-24 PROCEDURE — 99213 OFFICE O/P EST LOW 20 MIN: CPT | Performed by: FAMILY MEDICINE

## 2023-10-24 RX ORDER — TRAZODONE HYDROCHLORIDE 100 MG/1
TABLET ORAL
Qty: 30 TABLET | Refills: 0 | Status: SHIPPED | OUTPATIENT
Start: 2023-10-24

## 2023-10-24 NOTE — PATIENT INSTRUCTIONS
Patient is medically cleared for surgery by Dr. Jennifer Olivares at [facility] eye surgical [facility] for cataract surgery scheduled on  11/8/23 left eye cataract surgery.

## 2023-11-28 DIAGNOSIS — N18.30 CKD (CHRONIC KIDNEY DISEASE) STAGE 3, GFR 30-59 ML/MIN (HCC): ICD-10-CM

## 2023-11-29 RX ORDER — OMEPRAZOLE 20 MG/1
20 CAPSULE, DELAYED RELEASE ORAL DAILY
Qty: 30 CAPSULE | Refills: 0 | Status: SHIPPED | OUTPATIENT
Start: 2023-11-29

## 2023-12-02 DIAGNOSIS — G47.00 INSOMNIA, UNSPECIFIED TYPE: ICD-10-CM

## 2023-12-04 RX ORDER — TRAZODONE HYDROCHLORIDE 100 MG/1
TABLET ORAL
Qty: 30 TABLET | Refills: 0 | Status: SHIPPED | OUTPATIENT
Start: 2023-12-04

## 2023-12-29 DIAGNOSIS — N18.30 CKD (CHRONIC KIDNEY DISEASE) STAGE 3, GFR 30-59 ML/MIN (HCC): ICD-10-CM

## 2023-12-30 DIAGNOSIS — G47.00 INSOMNIA, UNSPECIFIED TYPE: ICD-10-CM

## 2024-01-02 RX ORDER — OMEPRAZOLE 20 MG/1
20 CAPSULE, DELAYED RELEASE ORAL DAILY
Qty: 30 CAPSULE | Refills: 5 | Status: SHIPPED | OUTPATIENT
Start: 2024-01-02

## 2024-01-02 RX ORDER — TRAZODONE HYDROCHLORIDE 100 MG/1
TABLET ORAL
Qty: 30 TABLET | Refills: 0 | Status: SHIPPED | OUTPATIENT
Start: 2024-01-02

## 2024-01-14 DIAGNOSIS — G47.00 INSOMNIA, UNSPECIFIED TYPE: ICD-10-CM

## 2024-01-15 RX ORDER — TRAZODONE HYDROCHLORIDE 100 MG/1
TABLET ORAL
Qty: 30 TABLET | Refills: 0 | Status: SHIPPED | OUTPATIENT
Start: 2024-01-15

## 2024-02-02 ENCOUNTER — TELEPHONE (OUTPATIENT)
Age: 44
End: 2024-02-02

## 2024-02-02 ENCOUNTER — OFFICE VISIT (OUTPATIENT)
Dept: FAMILY MEDICINE CLINIC | Facility: CLINIC | Age: 44
End: 2024-02-02
Payer: MEDICARE

## 2024-02-02 VITALS
TEMPERATURE: 97.8 F | DIASTOLIC BLOOD PRESSURE: 70 MMHG | OXYGEN SATURATION: 97 % | HEIGHT: 70 IN | RESPIRATION RATE: 16 BRPM | WEIGHT: 204.4 LBS | BODY MASS INDEX: 29.26 KG/M2 | HEART RATE: 83 BPM | SYSTOLIC BLOOD PRESSURE: 142 MMHG

## 2024-02-02 DIAGNOSIS — J06.9 ACUTE URI: Primary | ICD-10-CM

## 2024-02-02 PROCEDURE — 99214 OFFICE O/P EST MOD 30 MIN: CPT | Performed by: NURSE PRACTITIONER

## 2024-02-02 RX ORDER — AZITHROMYCIN 250 MG/1
TABLET, FILM COATED ORAL
Qty: 6 TABLET | Refills: 0 | Status: SHIPPED | OUTPATIENT
Start: 2024-02-02 | End: 2024-02-06

## 2024-02-02 NOTE — TELEPHONE ENCOUNTER
Patient's wife called and wantd to get her  in with Dr. Sprague.  PCP was out and she asked me to send a message letting him know he was sick.  Kidney transplant patient.

## 2024-02-02 NOTE — PROGRESS NOTES
Name: Charly eKene      : 1980      MRN: [id number]  Encounter Provider: JOSEF Paulino  Encounter Date: 2024   Encounter department: [facility]    Assessment & Plan     1. Acute URI  -     azithromycin (ZITHROMAX) 250 mg tablet; Take 2 tablets today then 1 tablet daily x 4 days    Recommend conservative management with over-the-counter medications as needed stay well-hydrated and rest.  Due to patient's history of transplant would be more cautious with starting antibiotic earlier than later.  Did order azithromycin for patient that he could start tonight or tomorrow if he wishes to watchful wait.  Patient had no recent use of antibiotics.  Patient agrees with treatment plan will call for any worsening symptoms or no improvement.  Offered medication for cough but patient declines       Subjective      Patient presents with some cold symptoms.  States that this started on Wednesday.  He did test for COVID on Thursday and this was negative.  He has been having sore throat and a cough.  Denies any wheezing or shortness of breath.  Denies any fevers or chills.        Review of Systems   Constitutional:  Negative for chills and fever.   HENT:  Positive for sore throat. Negative for congestion and sinus pain.    Eyes:  Negative for discharge.   Respiratory:  Positive for cough. Negative for shortness of breath.    Cardiovascular:  Negative for chest pain.   Gastrointestinal:  Negative for constipation and diarrhea.   Genitourinary:  Negative for difficulty urinating.   Musculoskeletal:  Negative for joint swelling.   Skin:  Negative for rash.   Neurological:  Negative for headaches.   Hematological:  Negative for adenopathy.   Psychiatric/Behavioral:  The patient is not nervous/anxious.        Current Outpatient Medications on File Prior to Visit   Medication Sig    acetaminophen (TYLENOL) 325 mg tablet Take 650 mg by mouth    Envarsus XR 1 MG TB24     Envarsus XR 4 MG TB24   "   ergocalciferol (VITAMIN D2) 50,000 units take 1 capsule by mouth EVERY 30 DAYS    labetalol (NORMODYNE) 300 mg tablet take 1 tablet by mouth three times a day (Patient taking differently: Take 300 mg by mouth 2 (two) times a day)    losartan (COZAAR) 50 mg tablet     magnesium chloride-calcium (SLOW-MAG) 71.5-119 mg Take 143 mg by mouth 2 (two) times a day    mycophenolate (CELLCEPT) 250 mg capsule     omeprazole (PriLOSEC) 20 mg delayed release capsule take 1 capsule by mouth once daily    predniSONE 5 mg tablet Take 5 mg by mouth daily    sodium bicarbonate 650 mg tablet Take 1,300 mg by mouth Three times a day    Sodium Zirconium Cyclosilicate 5 g PACK Take 5 g by mouth    spironolactone (ALDACTONE) 25 mg tablet Take 25 mg by mouth daily    traZODone (DESYREL) 100 mg tablet take 1 AND 1/2 tablets by mouth at bedtime if needed for sleep    Veltassa 8.4 g PACK     losartan (COZAAR) 25 mg tablet Take 50 mg by mouth daily (Patient not taking: Reported on 2/2/2024)    sulfamethoxazole-trimethoprim (BACTRIM) 400-80 mg per tablet  (Patient not taking: Reported on 10/3/2023)       Objective     /70   Pulse 83   Temp 97.8 °F (36.6 °C) (Temporal)   Resp 16   Ht 5' 10\" (1.778 m)   Wt 92.7 kg (204 lb 6.4 oz)   SpO2 97%   BMI 29.33 kg/m²     Physical Exam  Vitals and nursing note reviewed.   Constitutional:       General: He is not in acute distress.     Appearance: Normal appearance. He is well-developed. He is not diaphoretic.   HENT:      Head: Normocephalic and atraumatic.      Right Ear: External ear normal.      Left Ear: External ear normal.      Nose: Nose normal.      Mouth/Throat:      Pharynx: Posterior oropharyngeal erythema present. No oropharyngeal exudate.   Eyes:      General: Lids are normal.         Right eye: No discharge.         Left eye: No discharge.      Conjunctiva/sclera: Conjunctivae normal.   Cardiovascular:      Rate and Rhythm: Normal rate and regular rhythm.      Heart sounds: No " murmur heard.     Comments: Loud bruit from fistula of right arm  Pulmonary:      Effort: Pulmonary effort is normal. No respiratory distress.      Breath sounds: Normal breath sounds. No wheezing.   Musculoskeletal:         General: No deformity.   Skin:     General: Skin is warm and dry.   Neurological:      General: No focal deficit present.      Mental Status: He is alert and oriented to person, place, and time.   Psychiatric:         Speech: Speech normal.         Behavior: Behavior normal.         Thought Content: Thought content normal.         Judgment: Judgment normal.       JOSEF Paulino

## 2024-02-12 DIAGNOSIS — G47.00 INSOMNIA, UNSPECIFIED TYPE: ICD-10-CM

## 2024-02-12 RX ORDER — TRAZODONE HYDROCHLORIDE 100 MG/1
TABLET ORAL
Qty: 30 TABLET | Refills: 5 | Status: SHIPPED | OUTPATIENT
Start: 2024-02-12

## 2024-04-05 DIAGNOSIS — N18.30 CKD (CHRONIC KIDNEY DISEASE) STAGE 3, GFR 30-59 ML/MIN (HCC): ICD-10-CM

## 2024-04-05 RX ORDER — OMEPRAZOLE 20 MG/1
20 CAPSULE, DELAYED RELEASE ORAL DAILY
Qty: 30 CAPSULE | Refills: 0 | Status: SHIPPED | OUTPATIENT
Start: 2024-04-05

## 2024-04-29 DIAGNOSIS — G47.00 INSOMNIA, UNSPECIFIED TYPE: ICD-10-CM

## 2024-04-29 RX ORDER — TRAZODONE HYDROCHLORIDE 100 MG/1
TABLET ORAL
Qty: 135 TABLET | Refills: 1 | Status: SHIPPED | OUTPATIENT
Start: 2024-04-29

## 2024-05-02 DIAGNOSIS — N18.30 CKD (CHRONIC KIDNEY DISEASE) STAGE 3, GFR 30-59 ML/MIN (HCC): ICD-10-CM

## 2024-05-03 RX ORDER — OMEPRAZOLE 20 MG/1
20 CAPSULE, DELAYED RELEASE ORAL DAILY
Qty: 90 CAPSULE | Refills: 1 | Status: SHIPPED | OUTPATIENT
Start: 2024-05-03

## 2024-06-26 ENCOUNTER — OFFICE VISIT (OUTPATIENT)
Dept: DERMATOLOGY | Facility: CLINIC | Age: 44
End: 2024-06-26
Payer: MEDICARE

## 2024-06-26 VITALS — TEMPERATURE: 98.7 F | BODY MASS INDEX: 28.84 KG/M2 | WEIGHT: 201 LBS

## 2024-06-26 DIAGNOSIS — D22.72 MULTIPLE BENIGN MELANOCYTIC NEVI OF BOTH UPPER EXTREMITIES, BOTH LOWER EXTREMITIES, AND TRUNK: ICD-10-CM

## 2024-06-26 DIAGNOSIS — D84.9 IMMUNOSUPPRESSED STATUS (HCC): Primary | ICD-10-CM

## 2024-06-26 DIAGNOSIS — L81.4 SOLAR LENTIGO: ICD-10-CM

## 2024-06-26 DIAGNOSIS — D23.9 DERMATOFIBROMA: ICD-10-CM

## 2024-06-26 DIAGNOSIS — L73.9 FOLLICULITIS: ICD-10-CM

## 2024-06-26 DIAGNOSIS — D22.61 MULTIPLE BENIGN MELANOCYTIC NEVI OF BOTH UPPER EXTREMITIES, BOTH LOWER EXTREMITIES, AND TRUNK: ICD-10-CM

## 2024-06-26 DIAGNOSIS — D22.71 MULTIPLE BENIGN MELANOCYTIC NEVI OF BOTH UPPER EXTREMITIES, BOTH LOWER EXTREMITIES, AND TRUNK: ICD-10-CM

## 2024-06-26 DIAGNOSIS — D22.62 MULTIPLE BENIGN MELANOCYTIC NEVI OF BOTH UPPER EXTREMITIES, BOTH LOWER EXTREMITIES, AND TRUNK: ICD-10-CM

## 2024-06-26 DIAGNOSIS — D22.5 MULTIPLE BENIGN MELANOCYTIC NEVI OF BOTH UPPER EXTREMITIES, BOTH LOWER EXTREMITIES, AND TRUNK: ICD-10-CM

## 2024-06-26 PROCEDURE — 99213 OFFICE O/P EST LOW 20 MIN: CPT

## 2024-06-26 NOTE — PROGRESS NOTES
"[facility] Dermatology [facility] Note     Patient Name: Charly Keene  Encounter Date: 06/26/24     Have you been cared for by a [facility] Dermatologist in the last 3 years and, if so, which description applies to you?    Yes.  I have been here within the last 3 years, and my medical history has NOT changed since that time.  I am MALE/not capable of bearing children.    REVIEW OF SYSTEMS:  Have you recently had or currently have any of the following? No changes in my recent health.   PAST MEDICAL HISTORY:  Have you personally ever had or currently have any of the following?  If \"YES,\" then please provide more detail. No changes in my medical history.   HISTORY OF IMMUNOSUPPRESSION: Do you have a history of any of the following:  Systemic Immunosuppression such as Diabetes, Biologic or Immunotherapy, Chemotherapy, Organ Transplantation, Bone Marrow Transplantation?  No     Answering \"YES\" requires the addition of the dotphrase \"IMMUNOSUPPRESSED\" as the first diagnosis of the patient's visit.   FAMILY HISTORY:  Any \"first degree relatives\" (parent, brother, sister, or child) with the following?    No changes in my family's known health.   PATIENT EXPERIENCE:    Do you want the Dermatologist to perform a COMPLETE skin exam today including a clinical examination under the \"bra and underwear\" areas?  NO  If necessary, do we have your permission to call and leave a detailed message on your Preferred Phone number that includes your specific medical information?  Yes      Allergies   Allergen Reactions    Prednisone Other (See Comments) and Swelling     Dizzy, bloated, shakey  Edema with high doses. Reports as an intolerance        Current Outpatient Medications:     acetaminophen (TYLENOL) 325 mg tablet, Take 650 mg by mouth, Disp: , Rfl:     Envarsus XR 1 MG TB24, , Disp: , Rfl:     Envarsus XR 4 MG TB24, , Disp: , Rfl:     ergocalciferol (VITAMIN D2) 50,000 units, take 1 capsule by mouth EVERY 30 DAYS, Disp: , Rfl:     " "labetalol (NORMODYNE) 300 mg tablet, take 1 tablet by mouth three times a day (Patient taking differently: Take 300 mg by mouth 2 (two) times a day), Disp: 90 tablet, Rfl: 3    losartan (COZAAR) 25 mg tablet, Take 50 mg by mouth daily (Patient not taking: Reported on 2/2/2024), Disp: , Rfl:     losartan (COZAAR) 50 mg tablet, , Disp: , Rfl:     magnesium chloride-calcium (SLOW-MAG) 71.5-119 mg, Take 143 mg by mouth 2 (two) times a day, Disp: , Rfl:     mycophenolate (CELLCEPT) 250 mg capsule, , Disp: , Rfl:     omeprazole (PriLOSEC) 20 mg delayed release capsule, Take 1 capsule (20 mg total) by mouth daily, Disp: 90 capsule, Rfl: 1    predniSONE 5 mg tablet, Take 5 mg by mouth daily, Disp: , Rfl:     sodium bicarbonate 650 mg tablet, Take 1,300 mg by mouth Three times a day, Disp: , Rfl:     Sodium Zirconium Cyclosilicate 5 g PACK, Take 5 g by mouth, Disp: , Rfl:     spironolactone (ALDACTONE) 25 mg tablet, Take 25 mg by mouth daily, Disp: , Rfl:     sulfamethoxazole-trimethoprim (BACTRIM) 400-80 mg per tablet, , Disp: , Rfl:     traZODone (DESYREL) 100 mg tablet, take 1 AND 1/2 tablets by mouth at bedtime if needed for sleep, Disp: 135 tablet, Rfl: 1    Veltassa 8.4 g PACK, , Disp: , Rfl:           Whom besides the patient is providing clinical information about today's encounter?   NO ADDITIONAL HISTORIAN (patient alone provided history)    Physical Exam and Assessment/Plan by Diagnosis:    PATIENT IS IMMUNOSUPPRESSED DUE TO KIDNEY TRANSPLANT. HE IS ON CELLCEPT AND ENVARUS XR.        MELANOCYTIC NEVI (\"Moles\")    Physical Exam:  Anatomic Location Affected:   Mostly on sun-exposed areas of the trunk and extremities   Morphological Description:  Scattered, 1-4mm round to ovoid, symmetrical-appearing, even bordered, skin colored to dark brown macules/papules      Additional History of Present Condition:  Present on exam. Denies any pain, itch, bleeding. Present for years. Denies actively changing or growing moles. "     Assessment and Plan:  Based on a thorough discussion of this condition and the management approach to it (including a comprehensive discussion of the known risks, side effects and potential benefits of treatment), the patient (family) agrees to implement the following specific plan:  Reassured benign.   Monitor for changes. ROBIN's of melanoma handout provided.   Practice sun protection. Apply broad spectrum (UVA and UVB) sunscreen, SPF 30 or higher every 2 hours. Wear sun protective clothing, hats, and sunglasses.     LENTIGO    Physical Exam:  Anatomic Location Affected:  sun exposed areas of trunk  Morphological Description:  multiple scattered tan to brown evenly pigmented macules      Additional History of Present Condition:  Present on exam.     Assessment and Plan:  Based on a thorough discussion of this condition and the management approach to it (including a comprehensive discussion of the known risks, side effects and potential benefits of treatment), the patient (family) agrees to implement the following specific plan:  Reassured benign.   Practice sun protection. Apply broad spectrum (UVA and UVB) sunscreen, SPF 30 or higher every 2 hours. Wear sun protective clothing, hats, and sunglasses.           DERMATOFIBROMA    Physical Exam:  Anatomic Location Affected:  left posterior leg  Morphological Description:  firm pink papule with peripheral reticular network on dermoscopy   Pertinent Positives: + dimple sign       Additional History of Present Condition: present on exam     Assessment and Plan:  Based on a thorough discussion of this condition and the management approach to it (including a comprehensive discussion of the known risks, side effects and potential benefits of treatment), the patient (family) agrees to implement the following specific plan:  Reassured benign.       FOLLICULITIS    Physical Exam:  Anatomic Location Affected:  Back and upper arms   Morphological Description:  pink papules  and pustules     Additional History of Present Condition:  present on exam     Assessment and Plan:  Based on a thorough discussion of this condition and the management approach to it (including a comprehensive discussion of the known risks, side effects and potential benefits of treatment), the patient (family) agrees to implement the following specific plan:  Cleanse skin with benzoyl peroxide 10% wash when in shower. Lather into affected areas, let sit for 5 minutes then  rinse.      Follow-up: 6 months for skin exam with physician given patient is immunocompromised.   Scribe Attestation      I,:  Kym Anaya am acting as a scribe while in the presence of the attending physician.:       I,:  Yelitza Linn PA-C personally performed the services described in this documentation    as scribed in my presence.:

## 2024-06-26 NOTE — PATIENT INSTRUCTIONS
Assessment and Plan:  Based on a thorough discussion of this condition and the management approach to it (including a comprehensive discussion of the known risks, side effects and potential benefits of treatment), the patient (family) agrees to implement the following specific plan:  Wash with benzoyl peroxide 10%     What is folliculitis?  Folliculitis is the name given to a group of skin conditions in which there are inflamed hair follicles. The result is a tender red spot, often with a surface pustule.  Folliculitis may be superficial or deep. It can affect anywhere there are hairs, including chest, back, buttocks, arms and legs. Acne and its variants are also types of folliculitis.    What causes folliculitis?  Folliculitis can be due to infection, occlusion (blockage), irritation and various skin diseases.    Folliculitis due to infection  To determine if folliculitis is due to an infection, swabs should be taken from the pustules for cytology and culture in the laboratory.    Bacteria  Bacterial folliculitis is commonly due to Staphylococcus aureus. If the infection involves the deep part of the follicle, it results in a painful boil. Recommended treatment includes careful hygiene, antiseptic cleanser or cream, antibiotic ointment, and/or oral antibiotics.    Spa pool folliculitis is due to infection with Pseudomonas aeruginosa, which thrives in inadequately chlorinated warm water. Gram negative folliculitis is a pustular facial eruption also due to infection with Pseudomonas aeruginosa or other similar organisms. When it appears, it usually follows tetracycline treatment of acne, but is quite rare.    Yeasts  The most common yeast to cause a folliculitis is Pityrosporum ovale, also known as Malassezia. Malassezia folliculitis (Pityrosporum folliculitis) is an itchy acne-like condition usually affecting the upper trunk of a young adult. Treatment includes avoiding moisturisers, stopping any antibiotics and  topical antifungal or oral antifungal medication for several weeks.    Candida albicans can also provoke a folliculitis in skin folds (intertrigo) or in the beard area. It is treated with topical or oral antifungal agents.    Fungi  Ringworm of the scalp (tinea capitis) usually results in scaling and hair loss, but sometimes results in folliculitis. In [place], cat ringworm (Microsporum canis) is the commonest organism causing scalp fungal infection. Other fungi such as Trichophyton tonsurans are increasingly reported. Treatment is with oral antifungal agents for several months.    Viral infections  Folliculitis may caused by herpes simplex virus. This tends to be tender, and resolves without treatment in around 10 days. Severe recurrent attacks may be treated with acyclovir and other antiviral agents.    Herpes zoster (the cause of shingles) may also present as folliculitis with painful pustules and crusted spots within a dermatome (an area of skin supplied by a single nerve). It is treated with hihg-dose acyclovir.  Molluscum contagiosum, common in young children, may also cause follicular umbilicated papules, usually clustered in and around a body fold. Molluscum may provoke dermatitis.    Parasitic infection  Folliculitis on the face or scalp of older or immunosuppressed adults may be due to colonisation by hair follicle mites (demodex). This is known as demodicosis.  The human infestation, scabies, often provokes folliculitis, as well as non-follicular papules, vesicles and pustules.    Folliculitis due to irritation from regrowing hairs  Folliculitis may arise as hairs regrow after shaving, waxing, electrolysis or plucking. Swabs taken from the pustules are sterile ie there is no growth of bacteria or other organisms. In the beard area irritant folliculitis is known as pseudofolliculitis barbae.  Irritant folliculitis is also common on the lower legs of women (shaving rash). It is frequently very itchy.  Treatment is by stopping hair removal, and not beginning again for about three months after the folliculitis has settled. To prevent reoccurring irritant folliculitis, use a gentle hair removal method, such as a lady's electric razor. Avoid soap and apply plenty of shaving gel, if using a blade shaver.    Folliculitis due to contact reactions  Occlusion  Paraffin-based ointments, moisturisers, and adhesive plasters may all result in a sterile folliculitis. If a moisturiser is needed, choose an oil-free product, as it is less likely to cause occlusion.    Chemicals  Coal tar, cutting oils and other chemicals may cause an irritant folliculitis. Avoid contact with the causative product.    Topical steroids  Overuse of topical steroids may produce a folliculitis. Perioral dermatitis is a facial folliculitis provoked by moisturisers and topical steroids. Perioral dermatitis is treated with tetracycline antibiotics for six weeks or so.    Folliculitis due to immunosuppression  Eosinophilic folliculitis is a specific type of folliculitis that may arise in some immune suppressed individuals such as those infected by human immunodeficiency virus (HIV) or those who have cancer.    Folliculitis due to drugs  Folliculitis may be due to drugs, particularly corticosteroids (steroid acne), androgens (male hormones), ACTH, lithium, isoniazid (INH), phenytoin and B-complex vitamins. Protein kinase inhibitors (epidermal growth factor receptor inhibitors) and targeted therapy for metastatic melanoma (vemurafenib, dabrafenib) nearly always result in folliculitis.    Folliculitis due to inflammatory skin diseases  Certain uncommon inflammatory skin diseases may cause permanent hair loss and scarring because of deep seated sterile folliculitis. These include:  Lichen planus  Discoid lupus erythematosus  Folliculitis decalvans  Folliculitis keloidalis     Treatment depends on the underlying condition and its severity. A skin biopsy is  often necessary to establish the diagnosis.    Acne variants   Acne and acne-like or acneform disorders are also forms of folliculitis. These include:  Acne vulgaris  Nodulocystic acne  Rosacea  Scalp folliculitis  Chloracne    The follicular occlusion syndrome refers to:  Hidradenitis suppurativa (acne inversa)  Acne conglobata (a severe form of nodulocystic acne)  Dissecting cellulitis (perifolliculitis capitis abscedens et suffodiens)  Pilonidal sinus.    Treatment of the acne variants may include topical therapy as well as long courses of tetracycline antibiotics, isotretinoin (vitamin-A derivative) and in women, antiandrogenic therapy.    Buttock folliculitis  Folliculitis affecting the buttocks is quite common and is often nonspecific, ie no specific cause is found. Buttock folliculitis is equally common in males and females.  Acute buttock folliculitis is usually bacterial in origin (like boils), resulting in red painful papules and pustules. It clears with antibiotics.  Chronic buttock folliculitis does not often cause significant symptoms but it can be very persistent. Although antiseptics, topical acne treatments, peeling agents such as alphahydroxy acids, long courses of oral antibiotics and isotretinoin can help buttock folliculitis, they are not always effective. Hair removal might be worth trying if the affected area is hairy. As regrowth of hair can make it worse, permanent hair reduction by laser or intense pulsed light (IPL) is best.

## 2024-07-30 ENCOUNTER — TELEPHONE (OUTPATIENT)
Dept: DERMATOLOGY | Facility: CLINIC | Age: 44
End: 2024-07-30

## 2024-07-30 NOTE — TELEPHONE ENCOUNTER
...Bumped from Dr Anderson 09/03/24 [place] to Dr Anderson 09/04/24 [facility], [facility]  with location change (Dr Justin Verma Office to Dr Anderson [facility]), to call the office to confirm or r/s

## 2024-07-31 ENCOUNTER — RA CDI HCC (OUTPATIENT)
Dept: OTHER | Facility: HOSPITAL | Age: 44
End: 2024-07-31

## 2024-08-06 ENCOUNTER — OFFICE VISIT (OUTPATIENT)
Dept: FAMILY MEDICINE CLINIC | Facility: CLINIC | Age: 44
End: 2024-08-06
Payer: MEDICARE

## 2024-08-06 VITALS
TEMPERATURE: 97.6 F | OXYGEN SATURATION: 98 % | BODY MASS INDEX: 28.06 KG/M2 | DIASTOLIC BLOOD PRESSURE: 70 MMHG | HEIGHT: 70 IN | SYSTOLIC BLOOD PRESSURE: 120 MMHG | RESPIRATION RATE: 16 BRPM | HEART RATE: 73 BPM | WEIGHT: 196 LBS

## 2024-08-06 DIAGNOSIS — I10 HYPERTENSION, UNSPECIFIED TYPE: ICD-10-CM

## 2024-08-06 DIAGNOSIS — D84.9 IMMUNOSUPPRESSION (HCC): ICD-10-CM

## 2024-08-06 DIAGNOSIS — K21.9 GASTROESOPHAGEAL REFLUX DISEASE, UNSPECIFIED WHETHER ESOPHAGITIS PRESENT: ICD-10-CM

## 2024-08-06 DIAGNOSIS — Z94.0 HISTORY OF KIDNEY TRANSPLANT: ICD-10-CM

## 2024-08-06 DIAGNOSIS — E66.3 OVERWEIGHT (BMI 25.0-29.9): ICD-10-CM

## 2024-08-06 DIAGNOSIS — Z00.00 HEALTH CARE MAINTENANCE: ICD-10-CM

## 2024-08-06 DIAGNOSIS — Z00.00 MEDICARE ANNUAL WELLNESS VISIT, SUBSEQUENT: Primary | ICD-10-CM

## 2024-08-06 PROCEDURE — G0439 PPPS, SUBSEQ VISIT: HCPCS | Performed by: FAMILY MEDICINE

## 2024-08-06 PROCEDURE — 99214 OFFICE O/P EST MOD 30 MIN: CPT | Performed by: FAMILY MEDICINE

## 2024-08-06 RX ORDER — PENICILLIN V POTASSIUM 500 MG/1
500 TABLET ORAL 2 TIMES DAILY
COMMUNITY
Start: 2024-08-01

## 2024-08-06 RX ORDER — OLMESARTAN MEDOXOMIL 40 MG/1
40 TABLET ORAL EVERY EVENING
COMMUNITY

## 2024-08-06 NOTE — PATIENT INSTRUCTIONS
Medicare Preventive Visit Patient Instructions  Thank you for completing your Welcome to Medicare Visit or Medicare Annual Wellness Visit today. Your next wellness visit will be due in one year (8/7/2025).  The screening/preventive services that you may require over the next 5-10 years are detailed below. Some tests may not apply to you based off risk factors and/or age. Screening tests ordered at today's visit but not completed yet may show as past due. Also, please note that scanned in results may not display below.  Preventive Screenings:  Service Recommendations Previous Testing/Comments   Colorectal Cancer Screening  Colonoscopy    Fecal Occult Blood Test (FOBT)/Fecal Immunochemical Test (FIT)  Fecal DNA/Cologuard Test  Flexible Sigmoidoscopy Age: 45-75 years old   Colonoscopy: every 10 years (May be performed more frequently if at higher risk)  OR  FOBT/FIT: every 1 year  OR  Cologuard: every 3 years  OR  Sigmoidoscopy: every 5 years  Screening may be recommended earlier than age 45 if at higher risk for colorectal cancer. Also, an individualized decision between you and your healthcare provider will decide whether screening between the ages of 76-85 would be appropriate. Colonoscopy: Not on file  FOBT/FIT: Not on file  Cologuard: Not on file  Sigmoidoscopy: Not on file          Prostate Cancer Screening Individualized decision between patient and health care provider in men between ages of 55-69   Medicare will cover every 12 months beginning on the day after your 50th birthday PSA: No results in last 5 years     Screening Not Indicated     Hepatitis C Screening Once for adults born between 1945 and 1965  More frequently in patients at high risk for Hepatitis C Hep C Antibody: 04/06/2022    Screening Current   Diabetes Screening 1-2 times per year if you're at risk for diabetes or have pre-diabetes Fasting glucose: No results in last 5 years (No results in last 5 years)  A1C: No results in last 5 years (No  results in last 5 years)      Cholesterol Screening Once every 5 years if you don't have a lipid disorder. May order more often based on risk factors. Lipid panel: 11/30/2022  Screening Current      Other Preventive Screenings Covered by Medicare:  Abdominal Aortic Aneurysm (AAA) Screening: covered once if your at risk. You're considered to be at risk if you have a family history of AAA or a male between the age of 65-75 who smoking at least 100 cigarettes in your lifetime.  Lung Cancer Screening: covers low dose CT scan once per year if you meet all of the following conditions: (1) Age 55-77; (2) No signs or symptoms of lung cancer; (3) Current smoker or have quit smoking within the last 15 years; (4) You have a tobacco smoking history of at least 20 pack years (packs per day x number of years you smoked); (5) You get a written order from a healthcare provider.  Glaucoma Screening: covered annually if you're considered high risk: (1) You have diabetes OR (2) Family history of glaucoma OR (3)  aged 50 and older OR (4)  American aged 65 and older  Osteoporosis Screening: covered every 2 years if you meet one of the following conditions: (1) Have a vertebral abnormality; (2) On glucocorticoid therapy for more than 3 months; (3) Have primary hyperparathyroidism; (4) On osteoporosis medications and need to assess response to drug therapy.  HIV Screening: covered annually if you're between the age of 15-65. Also covered annually if you are younger than 15 and older than 65 with risk factors for HIV infection. For pregnant patients, it is covered up to 3 times per pregnancy.    Immunizations:  Immunization Recommendations   Influenza Vaccine Annual influenza vaccination during flu season is recommended for all persons aged >= 6 months who do not have contraindications   Pneumococcal Vaccine   * Pneumococcal conjugate vaccine = PCV13 (Prevnar 13), PCV15 (Vaxneuvance), PCV20 (Prevnar 20)  *  Pneumococcal polysaccharide vaccine = PPSV23 (Pneumovax) Adults 19-65 yo with certain risk factors or if 65+ yo  If never received any pneumonia vaccine: recommend Prevnar 20 (PCV20)  Give PCV20 if previously received 1 dose of PCV13 or PPSV23   Hepatitis B Vaccine 3 dose series if at intermediate or high risk (ex: diabetes, end stage renal disease, liver disease)   Respiratory syncytial virus (RSV) Vaccine - COVERED BY MEDICARE PART D  * RSVPreF3 (Arexvy) CDC recommends that adults 60 years of age and older may receive a single dose of RSV vaccine using shared clinical decision-making (SCDM)   Tetanus (Td) Vaccine - COST NOT COVERED BY MEDICARE PART B Following completion of primary series, a booster dose should be given every 10 years to maintain immunity against tetanus. Td may also be given as tetanus wound prophylaxis.   Tdap Vaccine - COST NOT COVERED BY MEDICARE PART B Recommended at least once for all adults. For pregnant patients, recommended with each pregnancy.   Shingles Vaccine (Shingrix) - COST NOT COVERED BY MEDICARE PART B  2 shot series recommended in those 19 years and older who have or will have weakened immune systems or those 50 years and older     Health Maintenance Due:      Topic Date Due    Colorectal Cancer Screening  Never done    HIV Screening  Completed    Hepatitis C Screening  Completed     Immunizations Due:      Topic Date Due    COVID-19 Vaccine (3 - Pfizer risk series) 10/15/2021    Pneumococcal Vaccine: Pediatrics (0 to 5 Years) and At-Risk Patients (6 to 64 Years) (3 of 3 - PPSV23 or PCV20) 07/25/2023    Influenza Vaccine (1) 09/01/2024     Advance Directives   What are advance directives?  Advance directives are legal documents that state your wishes and plans for medical care. These plans are made ahead of time in case you lose your ability to make decisions for yourself. Advance directives can apply to any medical decision, such as the treatments you want, and if you want to  donate organs.   What are the types of advance directives?  There are many types of advance directives, and each state has rules about how to use them. You may choose a combination of any of the following:  Living will:  This is a written record of the treatment you want. You can also choose which treatments you do not want, which to limit, and which to stop at a certain time. This includes surgery, medicine, IV fluid, and tube feedings.   Durable power of  for healthcare (DPAHC):  This is a written record that states who you want to make healthcare choices for you when you are unable to make them for yourself. This person, called a proxy, is usually a family member or a friend. You may choose more than 1 proxy.  Do not resuscitate (DNR) order:  A DNR order is used in case your heart stops beating or you stop breathing. It is a request not to have certain forms of treatment, such as CPR. A DNR order may be included in other types of advance directives.  Medical directive:  This covers the care that you want if you are in a coma, near death, or unable to make decisions for yourself. You can list the treatments you want for each condition. Treatment may include pain medicine, surgery, blood transfusions, dialysis, IV or tube feedings, and a ventilator (breathing machine).  Values history:  This document has questions about your views, beliefs, and how you feel and think about life. This information can help others choose the care that you would choose.  Why are advance directives important?  An advance directive helps you control your care. Although spoken wishes may be used, it is better to have your wishes written down. Spoken wishes can be misunderstood, or not followed. Treatments may be given even if you do not want them. An advance directive may make it easier for your family to make difficult choices about your care.   Weight Management   Why it is important to manage your weight:  Being overweight  increases your risk of health conditions such as heart disease, high blood pressure, type 2 diabetes, and certain types of cancer. It can also increase your risk for osteoarthritis, sleep apnea, and other respiratory problems. Aim for a slow, steady weight loss. Even a small amount of weight loss can lower your risk of health problems.  How to lose weight safely:  A safe and healthy way to lose weight is to eat fewer calories and get regular exercise. You can lose up about 1 pound a week by decreasing the number of calories you eat by 500 calories each day.   Healthy meal plan for weight management:  A healthy meal plan includes a variety of foods, contains fewer calories, and helps you stay healthy. A healthy meal plan includes the following:  Eat whole-grain foods more often.  A healthy meal plan should contain fiber. Fiber is the part of grains, fruits, and vegetables that is not broken down by your body. Whole-grain foods are healthy and provide extra fiber in your diet. Some examples of whole-grain foods are whole-wheat breads and pastas, oatmeal, brown rice, and bulgur.  Eat a variety of vegetables every day.  Include dark, leafy greens such as spinach, kale, perez greens, and mustard greens. Eat yellow and orange vegetables such as carrots, sweet potatoes, and winter squash.   Eat a variety of fruits every day.  Choose fresh or canned fruit (canned in its own juice or light syrup) instead of juice. Fruit juice has very little or no fiber.  Eat low-fat dairy foods.  Drink fat-free (skim) milk or 1% milk. Eat fat-free yogurt and low-fat cottage cheese. Try low-fat cheeses such as mozzarella and other reduced-fat cheeses.  Choose meat and other protein foods that are low in fat.  Choose beans or other legumes such as split peas or lentils. Choose fish, skinless poultry (chicken or turkey), or lean cuts of red meat (beef or pork). Before you cook meat or poultry, cut off any visible fat.   Use less fat and oil.   Try baking foods instead of frying them. Add less fat, such as margarine, sour cream, regular salad dressing and mayonnaise to foods. Eat fewer high-fat foods. Some examples of high-fat foods include french fries, doughnuts, ice cream, and cakes.  Eat fewer sweets.  Limit foods and drinks that are high in sugar. This includes candy, cookies, regular soda, and sweetened drinks.  Exercise:  Exercise at least 30 minutes per day on most days of the week. Some examples of exercise include walking, biking, dancing, and swimming. You can also fit in more physical activity by taking the stairs instead of the elevator or parking farther away from stores. Ask your healthcare provider about the best exercise plan for you.      © Copyright [employer] 2018 Information is for End User's use only and may not be sold, redistributed or otherwise used for commercial purposes. All illustrations and images included in CareNotes® are the copyrighted property of [email or web address] or [employer]        Here for AWV and advanced care planning is needed, adl's intact and home is safe. No memory concerns and also takes all meds as directed. BP stable on meds and sees [facility] every 3 months. Gerd stable and will fax vaccine records to [facility]. Use sunscreen and monitor for ticks. Sees Dermatology. Patient is going on vacation soon.

## 2024-08-06 NOTE — PROGRESS NOTES
Ambulatory Visit  Name: Charly Keene      : 1980      MRN: [id number]  Encounter Provider: Stefano Sprague DO  Encounter Date: 2024   Encounter department: [facility] PRIMARY CARE  Chief Complaint   Patient presents with    Medicare Wellness Visit     Patient Instructions   Medicare Preventive Visit Patient Instructions  Thank you for completing your Welcome to Medicare Visit or Medicare Annual Wellness Visit today. Your next wellness visit will be due in one year (2025).  The screening/preventive services that you may require over the next 5-10 years are detailed below. Some tests may not apply to you based off risk factors and/or age. Screening tests ordered at today's visit but not completed yet may show as past due. Also, please note that scanned in results may not display below.  Preventive Screenings:  Service Recommendations Previous Testing/Comments   Colorectal Cancer Screening  Colonoscopy    Fecal Occult Blood Test (FOBT)/Fecal Immunochemical Test (FIT)  Fecal DNA/Cologuard Test  Flexible Sigmoidoscopy Age: 45-75 years old   Colonoscopy: every 10 years (May be performed more frequently if at higher risk)  OR  FOBT/FIT: every 1 year  OR  Cologuard: every 3 years  OR  Sigmoidoscopy: every 5 years  Screening may be recommended earlier than age 45 if at higher risk for colorectal cancer. Also, an individualized decision between you and your healthcare provider will decide whether screening between the ages of 76-85 would be appropriate. Colonoscopy: Not on file  FOBT/FIT: Not on file  Cologuard: Not on file  Sigmoidoscopy: Not on file          Prostate Cancer Screening Individualized decision between patient and health care provider in men between ages of 55-69   Medicare will cover every 12 months beginning on the day after your 50th birthday PSA: No results in last 5 years     Screening Not Indicated     Hepatitis C Screening Once for adults born between 1945 and 1965  More  frequently in patients at high risk for Hepatitis C Hep C Antibody: 04/06/2022    Screening Current   Diabetes Screening 1-2 times per year if you're at risk for diabetes or have pre-diabetes Fasting glucose: No results in last 5 years (No results in last 5 years)  A1C: No results in last 5 years (No results in last 5 years)      Cholesterol Screening Once every 5 years if you don't have a lipid disorder. May order more often based on risk factors. Lipid panel: 11/30/2022  Screening Current      Other Preventive Screenings Covered by Medicare:  Abdominal Aortic Aneurysm (AAA) Screening: covered once if your at risk. You're considered to be at risk if you have a family history of AAA or a male between the age of 65-75 who smoking at least 100 cigarettes in your lifetime.  Lung Cancer Screening: covers low dose CT scan once per year if you meet all of the following conditions: (1) Age 55-77; (2) No signs or symptoms of lung cancer; (3) Current smoker or have quit smoking within the last 15 years; (4) You have a tobacco smoking history of at least 20 pack years (packs per day x number of years you smoked); (5) You get a written order from a healthcare provider.  Glaucoma Screening: covered annually if you're considered high risk: (1) You have diabetes OR (2) Family history of glaucoma OR (3)  aged 50 and older OR (4)  American aged 65 and older  Osteoporosis Screening: covered every 2 years if you meet one of the following conditions: (1) Have a vertebral abnormality; (2) On glucocorticoid therapy for more than 3 months; (3) Have primary hyperparathyroidism; (4) On osteoporosis medications and need to assess response to drug therapy.  HIV Screening: covered annually if you're between the age of 15-65. Also covered annually if you are younger than 15 and older than 65 with risk factors for HIV infection. For pregnant patients, it is covered up to 3 times per  pregnancy.    Immunizations:  Immunization Recommendations   Influenza Vaccine Annual influenza vaccination during flu season is recommended for all persons aged >= 6 months who do not have contraindications   Pneumococcal Vaccine   * Pneumococcal conjugate vaccine = PCV13 (Prevnar 13), PCV15 (Vaxneuvance), PCV20 (Prevnar 20)  * Pneumococcal polysaccharide vaccine = PPSV23 (Pneumovax) Adults 19-65 yo with certain risk factors or if 65+ yo  If never received any pneumonia vaccine: recommend Prevnar 20 (PCV20)  Give PCV20 if previously received 1 dose of PCV13 or PPSV23   Hepatitis B Vaccine 3 dose series if at intermediate or high risk (ex: diabetes, end stage renal disease, liver disease)   Respiratory syncytial virus (RSV) Vaccine - COVERED BY MEDICARE PART D  * RSVPreF3 (Arexvy) CDC recommends that adults 60 years of age and older may receive a single dose of RSV vaccine using shared clinical decision-making (SCDM)   Tetanus (Td) Vaccine - COST NOT COVERED BY MEDICARE PART B Following completion of primary series, a booster dose should be given every 10 years to maintain immunity against tetanus. Td may also be given as tetanus wound prophylaxis.   Tdap Vaccine - COST NOT COVERED BY MEDICARE PART B Recommended at least once for all adults. For pregnant patients, recommended with each pregnancy.   Shingles Vaccine (Shingrix) - COST NOT COVERED BY MEDICARE PART B  2 shot series recommended in those 19 years and older who have or will have weakened immune systems or those 50 years and older     Health Maintenance Due:      Topic Date Due    Colorectal Cancer Screening  Never done    HIV Screening  Completed    Hepatitis C Screening  Completed     Immunizations Due:      Topic Date Due    COVID-19 Vaccine (3 - Pfizer risk series) 10/15/2021    Pneumococcal Vaccine: Pediatrics (0 to 5 Years) and At-Risk Patients (6 to 64 Years) (3 of 3 - PPSV23 or PCV20) 07/25/2023    Influenza Vaccine (1) 09/01/2024     Advance  Directives   What are advance directives?  Advance directives are legal documents that state your wishes and plans for medical care. These plans are made ahead of time in case you lose your ability to make decisions for yourself. Advance directives can apply to any medical decision, such as the treatments you want, and if you want to donate organs.   What are the types of advance directives?  There are many types of advance directives, and each state has rules about how to use them. You may choose a combination of any of the following:  Living will:  This is a written record of the treatment you want. You can also choose which treatments you do not want, which to limit, and which to stop at a certain time. This includes surgery, medicine, IV fluid, and tube feedings.   Durable power of  for healthcare (DPAHC):  This is a written record that states who you want to make healthcare choices for you when you are unable to make them for yourself. This person, called a proxy, is usually a family member or a friend. You may choose more than 1 proxy.  Do not resuscitate (DNR) order:  A DNR order is used in case your heart stops beating or you stop breathing. It is a request not to have certain forms of treatment, such as CPR. A DNR order may be included in other types of advance directives.  Medical directive:  This covers the care that you want if you are in a coma, near death, or unable to make decisions for yourself. You can list the treatments you want for each condition. Treatment may include pain medicine, surgery, blood transfusions, dialysis, IV or tube feedings, and a ventilator (breathing machine).  Values history:  This document has questions about your views, beliefs, and how you feel and think about life. This information can help others choose the care that you would choose.  Why are advance directives important?  An advance directive helps you control your care. Although spoken wishes may be used, it  is better to have your wishes written down. Spoken wishes can be misunderstood, or not followed. Treatments may be given even if you do not want them. An advance directive may make it easier for your family to make difficult choices about your care.   Weight Management   Why it is important to manage your weight:  Being overweight increases your risk of health conditions such as heart disease, high blood pressure, type 2 diabetes, and certain types of cancer. It can also increase your risk for osteoarthritis, sleep apnea, and other respiratory problems. Aim for a slow, steady weight loss. Even a small amount of weight loss can lower your risk of health problems.  How to lose weight safely:  A safe and healthy way to lose weight is to eat fewer calories and get regular exercise. You can lose up about 1 pound a week by decreasing the number of calories you eat by 500 calories each day.   Healthy meal plan for weight management:  A healthy meal plan includes a variety of foods, contains fewer calories, and helps you stay healthy. A healthy meal plan includes the following:  Eat whole-grain foods more often.  A healthy meal plan should contain fiber. Fiber is the part of grains, fruits, and vegetables that is not broken down by your body. Whole-grain foods are healthy and provide extra fiber in your diet. Some examples of whole-grain foods are whole-wheat breads and pastas, oatmeal, brown rice, and bulgur.  Eat a variety of vegetables every day.  Include dark, leafy greens such as spinach, kale, perez greens, and mustard greens. Eat yellow and orange vegetables such as carrots, sweet potatoes, and winter squash.   Eat a variety of fruits every day.  Choose fresh or canned fruit (canned in its own juice or light syrup) instead of juice. Fruit juice has very little or no fiber.  Eat low-fat dairy foods.  Drink fat-free (skim) milk or 1% milk. Eat fat-free yogurt and low-fat cottage cheese. Try low-fat cheeses such as  mozzarella and other reduced-fat cheeses.  Choose meat and other protein foods that are low in fat.  Choose beans or other legumes such as split peas or lentils. Choose fish, skinless poultry (chicken or turkey), or lean cuts of red meat (beef or pork). Before you cook meat or poultry, cut off any visible fat.   Use less fat and oil.  Try baking foods instead of frying them. Add less fat, such as margarine, sour cream, regular salad dressing and mayonnaise to foods. Eat fewer high-fat foods. Some examples of high-fat foods include french fries, doughnuts, ice cream, and cakes.  Eat fewer sweets.  Limit foods and drinks that are high in sugar. This includes candy, cookies, regular soda, and sweetened drinks.  Exercise:  Exercise at least 30 minutes per day on most days of the week. Some examples of exercise include walking, biking, dancing, and swimming. You can also fit in more physical activity by taking the stairs instead of the elevator or parking farther away from stores. Ask your healthcare provider about the best exercise plan for you.      © Copyright [employer] 2018 Information is for End User's use only and may not be sold, redistributed or otherwise used for commercial purposes. All illustrations and images included in CareNotes® are the copyrighted property of [employer] or [employer]        Here for AWV and advanced care planning is needed, adl's intact and home is safe. No memory concerns and also takes all meds as directed. BP stable on meds and sees [facility] team every 3 months. Gerd stable and will fax vaccine records to [facility]. Use sunscreen and monitor for ticks. Sees Dermatology. Patient is going on vacation soon.     Assessment & Plan   1. Medicare annual wellness visit, subsequent  2. Health care maintenance  3. Hypertension, unspecified type  Comments:  stable on meds  4. History of kidney transplant  Comments:  stable and has had both kidneys transplanted, sees  [place]  5. Overweight (BMI 25.0-29.9)  Comments:  eat healthy and stay active  6. Immunosuppression (HCC)  Comments:  vaccines faxed to [facility]  7. Gastroesophageal reflux disease, unspecified whether esophagitis present  Comments:  stable       Preventive health issues were discussed with patient, and age appropriate screening tests were ordered as noted in patient's After Visit Summary. Personalized health advice and appropriate referrals for health education or preventive services given if needed, as noted in patient's After Visit Summary.    History of Present Illness     Here for AWV and doing well but needs adavanced care planning. ADL's intact and home is safe. Patient had transplant 3/29/22 for left kidney but had right kidney transplant 6/30/2009 at [facility], in [place], [place]. Patient has appt every 3 months. Patient is starting treatment for immunosuppressive therapy and needs vaccine records to be faxed to [facility]. This is a trial drug for patient rec by [facility] team.        Patient Care Team:  Stefano Sprague DO as PCP - General (Family Medicine)  Jean-Pierre Hoang DO    Review of Systems   Constitutional: Negative.    HENT: Negative.     Eyes: Negative.    Respiratory: Negative.     Cardiovascular: Negative.    Gastrointestinal: Negative.    Endocrine: Negative.    Genitourinary: Negative.    Musculoskeletal: Negative.    Skin: Negative.    Allergic/Immunologic: Negative.    Neurological: Negative.    Hematological: Negative.    Psychiatric/Behavioral: Negative.       Medical History Reviewed by provider this encounter:  Tobacco  Allergies  Meds  Problems  Med Hx  Surg Hx  Fam Hx       Annual Wellness Visit Questionnaire   Charly is here for his Subsequent Wellness visit.     Health Risk Assessment:   Patient rates overall health as very good. Patient feels that their physical health rating is same. Patient is very satisfied with their life. Eyesight was rated as same.  Hearing was rated as same. Patient feels that their emotional and mental health rating is same. Patients states they are never, rarely angry. Patient states they are sometimes unusually tired/fatigued. Pain experienced in the last 7 days has been none. Patient states that he has experienced no weight loss or gain in last 6 months.     Depression Screening:   PHQ-2 Score: 0      Fall Risk Screening:   In the past year, patient has experienced: no history of falling in past year      Home Safety:  Patient does not have trouble with stairs inside or outside of their home. Patient has working smoke alarms and has no working carbon monoxide detector. Home safety hazards include: none. No gas in home    Nutrition:   Current diet is Regular.     Medications:   Patient is not currently taking any over-the-counter supplements. Patient is able to manage medications.     Activities of Daily Living (ADLs)/Instrumental Activities of Daily Living (IADLs):   Walk and transfer into and out of bed and chair?: Yes  Dress and groom yourself?: Yes    Bathe or shower yourself?: Yes    Feed yourself? Yes  Do your laundry/housekeeping?: Yes  Manage your money, pay your bills and track your expenses?: Yes  Make your own meals?: Yes    Do your own shopping?: Yes    Previous Hospitalizations:   Any hospitalizations or ED visits within the last 12 months?: No      Advance Care Planning:   Living will: No    Durable POA for healthcare: No    Advanced directive: No      Comments: Consider getting advanced care planning    Cognitive Screening:   Provider or family/friend/caregiver concerned regarding cognition?: No    PREVENTIVE SCREENINGS      Cardiovascular Screening:    General: Screening Current      Prostate Cancer Screening:    General: Screening Not Indicated      Lung Cancer Screening:     General: Screening Not Indicated      Hepatitis C Screening:    General: Screening Current    Screening, Brief Intervention, and Referral to Treatment  "(SBIRT)    Screening  Typical number of drinks in a day: 0  Typical number of drinks in a week: 0  Interpretation: Low risk drinking behavior.    AUDIT-C Screenin) How often did you have a drink containing alcohol in the past year? never  2) How many drinks did you have on a typical day when you were drinking in the past year? 0  3) How often did you have 6 or more drinks on one occasion in the past year? never    AUDIT-C Score: 0  Interpretation: Score 0-3 (male): Negative screen for alcohol misuse    Single Item Drug Screening:  How often have you used an illegal drug (including marijuana) or a prescription medication for non-medical reasons in the past year? never    Single Item Drug Screen Score: 0  Interpretation: Negative screen for possible drug use disorder    Social Determinants of Health     Financial Resource Strain: Unknown (2022)    Overall Financial Resource Strain (CARDIA)     Difficulty of Paying Living Expenses: Patient declined   Food Insecurity: No Food Insecurity (2024)    Hunger Vital Sign     Worried About Running Out of Food in the Last Year: Never true     Ran Out of Food in the Last Year: Never true   Transportation Needs: No Transportation Needs (2024)    PRAPARE - Transportation     Lack of Transportation (Medical): No     Lack of Transportation (Non-Medical): No   Housing Stability: Low Risk  (2024)    Housing Stability Vital Sign     Unable to Pay for Housing in the Last Year: No     Number of Times Moved in the Last Year: 0     Homeless in the Last Year: No   Utilities: Not At Risk (2024)    [facility] Utilities     Threatened with loss of utilities: No     No results found.    Objective     /70   Pulse 73   Temp 97.6 °F (36.4 °C) (Temporal)   Resp 16   Ht 5' 10\" (1.778 m)   Wt 88.9 kg (196 lb)   SpO2 98%   BMI 28.12 kg/m²     Physical Exam  Constitutional:       Appearance: Normal appearance. He is well-developed.      Comments: overweight   HENT:      " Head: Normocephalic and atraumatic.      Right Ear: External ear normal.      Left Ear: External ear normal.      Nose: Nose normal.      Mouth/Throat:      Mouth: Mucous membranes are moist.   Eyes:      Conjunctiva/sclera: Conjunctivae normal.      Pupils: Pupils are equal, round, and reactive to light.   Cardiovascular:      Rate and Rhythm: Normal rate and regular rhythm.      Pulses: Normal pulses.      Heart sounds: Normal heart sounds.   Pulmonary:      Effort: Pulmonary effort is normal.      Breath sounds: Normal breath sounds.   Musculoskeletal:         General: Normal range of motion.      Cervical back: Normal range of motion and neck supple.   Skin:     General: Skin is warm and dry.      Capillary Refill: Capillary refill takes less than 2 seconds.   Neurological:      General: No focal deficit present.      Mental Status: He is alert and oriented to person, place, and time. Mental status is at baseline.      Deep Tendon Reflexes: Reflexes are normal and symmetric.   Psychiatric:         Mood and Affect: Mood normal.         Behavior: Behavior normal.         Thought Content: Thought content normal.         Judgment: Judgment normal.

## 2024-09-03 ENCOUNTER — TELEPHONE (OUTPATIENT)
Age: 44
End: 2024-09-03

## 2024-09-03 NOTE — TELEPHONE ENCOUNTER
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 Patient is currently at [facility] office and the provider at [facility] wants to speak with the provider at [place]. No answer to call x 5.  I tried clerical and clinical. Please call immediately @ [phone] ext [phone].

## 2024-09-30 ENCOUNTER — PATIENT MESSAGE (OUTPATIENT)
Dept: FAMILY MEDICINE CLINIC | Facility: CLINIC | Age: 44
End: 2024-09-30

## 2024-10-17 DIAGNOSIS — N18.30 CKD (CHRONIC KIDNEY DISEASE) STAGE 3, GFR 30-59 ML/MIN (HCC): ICD-10-CM

## 2024-10-18 DIAGNOSIS — G47.00 INSOMNIA, UNSPECIFIED TYPE: ICD-10-CM

## 2024-10-21 RX ORDER — TRAZODONE HYDROCHLORIDE 100 MG/1
TABLET ORAL
Qty: 135 TABLET | Refills: 1 | Status: SHIPPED | OUTPATIENT
Start: 2024-10-21

## 2025-01-30 ENCOUNTER — TELEPHONE (OUTPATIENT)
Dept: FAMILY MEDICINE CLINIC | Facility: CLINIC | Age: 45
End: 2025-01-30

## 2025-02-04 ENCOUNTER — TELEPHONE (OUTPATIENT)
Dept: FAMILY MEDICINE CLINIC | Facility: CLINIC | Age: 45
End: 2025-02-04

## 2025-02-04 NOTE — TELEPHONE ENCOUNTER
PA for omeprazole (PriLOSEC) 20 mg delayed release capsule SUBMITTED to     via    [x]CMM-KEY: [id number]   []Surescripts-Case ID #   []Availity-Auth ID # NDC #   []Faxed to plan   []Other website   []Phone call Case ID #     [x]PA sent as URGENT    All office notes, labs and other pertaining documents and studies sent. Clinical questions answered. Awaiting determination from insurance company.     Turnaround time for your insurance to make a decision on your Prior Authorization can take 7-21 business days.

## 2025-02-04 NOTE — TELEPHONE ENCOUNTER
Jennifer faxed a Medical Exception/Prior authorization request form to be filled out by the doctor  Form placed in folder

## 2025-02-04 NOTE — TELEPHONE ENCOUNTER
PA for omeprazole (PriLOSEC) 20 mg delayed release capsule  APPROVED     Date(s) approved 2/4/2025-2/4/2026    Patient advised by          [][employer] Message  [x]Phone call   []LMOM  []L/M to call office as no active Communication consent on file  []Unable to leave detailed message as VM not approved on Communication consent       Pharmacy advised by    [x]Fax  []Phone call    Approval letter scanned into Media Yes

## 2025-02-06 DIAGNOSIS — N18.30 CKD (CHRONIC KIDNEY DISEASE) STAGE 3, GFR 30-59 ML/MIN (HCC): ICD-10-CM

## 2025-06-10 ENCOUNTER — OFFICE VISIT (OUTPATIENT)
Dept: FAMILY MEDICINE CLINIC | Facility: CLINIC | Age: 45
End: 2025-06-10
Payer: COMMERCIAL

## 2025-06-10 VITALS
WEIGHT: 184.2 LBS | OXYGEN SATURATION: 98 % | DIASTOLIC BLOOD PRESSURE: 70 MMHG | BODY MASS INDEX: 24.95 KG/M2 | TEMPERATURE: 98.1 F | HEIGHT: 72 IN | SYSTOLIC BLOOD PRESSURE: 110 MMHG

## 2025-06-10 DIAGNOSIS — Z13.220 SCREENING FOR HYPERLIPIDEMIA: ICD-10-CM

## 2025-06-10 DIAGNOSIS — Z00.00 ANNUAL PHYSICAL EXAM: ICD-10-CM

## 2025-06-10 DIAGNOSIS — K21.9 GASTROESOPHAGEAL REFLUX DISEASE, UNSPECIFIED WHETHER ESOPHAGITIS PRESENT: ICD-10-CM

## 2025-06-10 DIAGNOSIS — I10 HYPERTENSION, UNSPECIFIED TYPE: ICD-10-CM

## 2025-06-10 DIAGNOSIS — G47.00 INSOMNIA, UNSPECIFIED TYPE: ICD-10-CM

## 2025-06-10 DIAGNOSIS — N18.30 CKD (CHRONIC KIDNEY DISEASE) STAGE 3, GFR 30-59 ML/MIN (HCC): ICD-10-CM

## 2025-06-10 DIAGNOSIS — Z12.11 SCREENING FOR COLON CANCER: ICD-10-CM

## 2025-06-10 DIAGNOSIS — Z00.00 HEALTH CARE MAINTENANCE: Primary | ICD-10-CM

## 2025-06-10 PROCEDURE — 99396 PREV VISIT EST AGE 40-64: CPT | Performed by: FAMILY MEDICINE

## 2025-06-10 RX ORDER — OMEPRAZOLE 20 MG/1
20 CAPSULE, DELAYED RELEASE ORAL DAILY
Qty: 90 CAPSULE | Refills: 1 | Status: SHIPPED | OUTPATIENT
Start: 2025-06-10

## 2025-06-10 RX ORDER — TRAZODONE HYDROCHLORIDE 100 MG/1
TABLET ORAL
Qty: 135 TABLET | Refills: 1 | Status: SHIPPED | OUTPATIENT
Start: 2025-06-10

## 2025-06-10 NOTE — PATIENT INSTRUCTIONS
"Patient Education     Routine physical for adults   The Basics   Written by the doctors and editors at [facility]   What is a physical? -- A physical is a routine visit, or \"check-up,\" with your doctor. You might also hear it called a \"wellness visit\" or \"preventive visit.\"  During each visit, the doctor will:   Ask about your physical and mental health   Ask about your habits, behaviors, and lifestyle   Do an exam   Give you vaccines if needed   Talk to you about any medicines you take   Give advice about your health   Answer your questions  Getting regular check-ups is an important part of taking care of your health. It can help your doctor find and treat any problems you have. But it's also important for preventing health problems.  A routine physical is different from a \"sick visit.\" A sick visit is when you see a doctor because of a health concern or problem. Since physicals are scheduled ahead of time, you can think about what you want to ask the doctor.  How often should I get a physical? -- It depends on your age and health. In general, for people age 21 years and older:   If you are younger than 50 years, you might be able to get a physical every 3 years.   If you are 50 years or older, your doctor might recommend a physical every year.  If you have an ongoing health condition, like diabetes or high blood pressure, your doctor will probably want to see you more often.  What happens during a physical? -- In general, each visit will include:   Physical exam - The doctor or nurse will check your height, weight, heart rate, and blood pressure. They will also look at your eyes and ears. They will ask about how you are feeling and whether you have any symptoms that bother you.   Medicines - It's a good idea to bring a list of all the medicines you take to each doctor visit. Your doctor will talk to you about your medicines and answer any questions. Tell them if you are having any side effects that bother you. You " "should also tell them if you are having trouble paying for any of your medicines.   Habits and behaviors - This includes:   Your diet   Your exercise habits   Whether you smoke, drink alcohol, or use drugs   Whether you are sexually active   Whether you feel safe at home  Your doctor will talk to you about things you can do to improve your health and lower your risk of health problems. They will also offer help and support. For example, if you want to quit smoking, they can give you advice and might prescribe medicines. If you want to improve your diet or get more physical activity, they can help you with this, too.   Lab tests, if needed - The tests you get will depend on your age and situation. For example, your doctor might want to check your:   Cholesterol   Blood sugar   Iron level   Vaccines - The recommended vaccines will depend on your age, health, and what vaccines you already had. Vaccines are very important because they can prevent certain serious or deadly infections.   Discussion of screening - \"Screening\" means checking for diseases or other health problems before they cause symptoms. Your doctor can recommend screening based on your age, risk, and preferences. This might include tests to check for:   Cancer, such as breast, prostate, cervical, ovarian, colorectal, prostate, lung, or skin cancer   Sexually transmitted infections, such as chlamydia and gonorrhea   Mental health conditions like depression and anxiety  Your doctor will talk to you about the different types of screening tests. They can help you decide which screenings to have. They can also explain what the results might mean.   Answering questions - The physical is a good time to ask the doctor or nurse questions about your health. If needed, they can refer you to other doctors or specialists, too.  Adults older than 65 years often need other care, too. As you get older, your doctor will talk to you about:   How to prevent falling at " home   Hearing or vision tests   Memory testing   How to take your medicines safely   Making sure that you have the help and support you need at home  All topics are updated as new evidence becomes available and our peer review process is complete.  This topic retrieved from [employer] on: May 02, 2024.  Topic 683030 Version 1.0  Release: 32.4.3 - C32.122  © 2024 UpToDate, Inc. and/or its affiliates. All rights reserved.  Consumer Information Use and Disclaimer   Disclaimer: This generalized information is a limited summary of diagnosis, treatment, and/or medication information. It is not meant to be comprehensive and should be used as a tool to help the user understand and/or assess potential diagnostic and treatment options. It does NOT include all information about conditions, treatments, medications, side effects, or risks that may apply to a specific patient. It is not intended to be medical advice or a substitute for the medical advice, diagnosis, or treatment of a health care provider based on the health care provider's examination and assessment of a patient's specific and unique circumstances. Patients must speak with a health care provider for complete information about their health, medical questions, and treatment options, including any risks or benefits regarding use of medications. This information does not endorse any treatments or medications as safe, effective, or approved for treating a specific patient. UpToDate, Inc. and its affiliates disclaim any warranty or liability relating to this information or the use thereof.The use of this information is governed by the Terms of Use, available at https://www.woltersRootsRateduwer.com/en/know/clinical-effectiveness-terms. 2024© UpToDate, Inc. and its affiliates and/or licensors. All rights reserved.  Copyright   © 2024 UpToDate, Inc. and/or its affiliates. All rights reserved.  HTN stable and takes all meds as directed and sees nephrology as directed monthly. CKD  stable and sees nephrology as directed and also refilled trazodone for insomnia and gerd stable and needs colon screening and also

## 2025-06-10 NOTE — ASSESSMENT & PLAN NOTE
Stable and refilled trazodone  Orders:    traZODone (DESYREL) 100 mg tablet; take 1 AND 1/2 tablets by mouth at bedtime if needed for sleep

## 2025-06-10 NOTE — PROGRESS NOTES
"Adult Annual Physical  Name: Charly Keene      : 1980      MRN: [id number]  Encounter Provider: Stefano Sprague DO  Encounter Date: 6/10/2025   Encounter department: [facility] PRIMARY CARE  Chief Complaint   Patient presents with    Physical Exam    Medication Refill     Patient Instructions   Patient Education     Routine physical for adults   The Basics   Written by the doctors and editors at [employer]   What is a physical? -- A physical is a routine visit, or \"check-up,\" with your doctor. You might also hear it called a \"wellness visit\" or \"preventive visit.\"  During each visit, the doctor will:   Ask about your physical and mental health   Ask about your habits, behaviors, and lifestyle   Do an exam   Give you vaccines if needed   Talk to you about any medicines you take   Give advice about your health   Answer your questions  Getting regular check-ups is an important part of taking care of your health. It can help your doctor find and treat any problems you have. But it's also important for preventing health problems.  A routine physical is different from a \"sick visit.\" A sick visit is when you see a doctor because of a health concern or problem. Since physicals are scheduled ahead of time, you can think about what you want to ask the doctor.  How often should I get a physical? -- It depends on your age and health. In general, for people age 21 years and older:   If you are younger than 50 years, you might be able to get a physical every 3 years.   If you are 50 years or older, your doctor might recommend a physical every year.  If you have an ongoing health condition, like diabetes or high blood pressure, your doctor will probably want to see you more often.  What happens during a physical? -- In general, each visit will include:   Physical exam - The doctor or nurse will check your height, weight, heart rate, and blood pressure. They will also look at your eyes and ears. They will ask " "about how you are feeling and whether you have any symptoms that bother you.   Medicines - It's a good idea to bring a list of all the medicines you take to each doctor visit. Your doctor will talk to you about your medicines and answer any questions. Tell them if you are having any side effects that bother you. You should also tell them if you are having trouble paying for any of your medicines.   Habits and behaviors - This includes:   Your diet   Your exercise habits   Whether you smoke, drink alcohol, or use drugs   Whether you are sexually active   Whether you feel safe at home  Your doctor will talk to you about things you can do to improve your health and lower your risk of health problems. They will also offer help and support. For example, if you want to quit smoking, they can give you advice and might prescribe medicines. If you want to improve your diet or get more physical activity, they can help you with this, too.   Lab tests, if needed - The tests you get will depend on your age and situation. For example, your doctor might want to check your:   Cholesterol   Blood sugar   Iron level   Vaccines - The recommended vaccines will depend on your age, health, and what vaccines you already had. Vaccines are very important because they can prevent certain serious or deadly infections.   Discussion of screening - \"Screening\" means checking for diseases or other health problems before they cause symptoms. Your doctor can recommend screening based on your age, risk, and preferences. This might include tests to check for:   Cancer, such as breast, prostate, cervical, ovarian, colorectal, prostate, lung, or skin cancer   Sexually transmitted infections, such as chlamydia and gonorrhea   Mental health conditions like depression and anxiety  Your doctor will talk to you about the different types of screening tests. They can help you decide which screenings to have. They can also explain what the results might " mean.   Answering questions - The physical is a good time to ask the doctor or nurse questions about your health. If needed, they can refer you to other doctors or specialists, too.  Adults older than 65 years often need other care, too. As you get older, your doctor will talk to you about:   How to prevent falling at home   Hearing or vision tests   Memory testing   How to take your medicines safely   Making sure that you have the help and support you need at home  All topics are updated as new evidence becomes available and our peer review process is complete.  This topic retrieved from [employer] on: May 02, 2024.  Topic 388226 Version 1.0  Release: 32.4.3 - C32.122  © 2024 UpToDate, Inc. and/or its affiliates. All rights reserved.  Consumer Information Use and Disclaimer   Disclaimer: This generalized information is a limited summary of diagnosis, treatment, and/or medication information. It is not meant to be comprehensive and should be used as a tool to help the user understand and/or assess potential diagnostic and treatment options. It does NOT include all information about conditions, treatments, medications, side effects, or risks that may apply to a specific patient. It is not intended to be medical advice or a substitute for the medical advice, diagnosis, or treatment of a health care provider based on the health care provider's examination and assessment of a patient's specific and unique circumstances. Patients must speak with a health care provider for complete information about their health, medical questions, and treatment options, including any risks or benefits regarding use of medications. This information does not endorse any treatments or medications as safe, effective, or approved for treating a specific patient. UpToDate, Inc. and its affiliates disclaim any warranty or liability relating to this information or the use thereof.The use of this information is governed by the Terms of Use,  available at https://www.woltersMobile-XLuwer.com/en/know/clinical-effectiveness-terms. 2024© UpToDate, Inc. and its affiliates and/or licensors. All rights reserved.  Copyright   © 2024 UpToDate, Inc. and/or its affiliates. All rights reserved.  HTN stable and takes all meds as directed and sees nephrology as directed monthly. CKD stable and sees nephrology as directed and also refilled trazodone for insomnia and gerd stable and needs colon screening and also     :  Assessment & Plan  Health care maintenance  Stable and needs cholesterol checked  Orders:    Comprehensive metabolic panel; Future    Annual physical exam  Needs colon screening as directed   Orders:    Comprehensive metabolic panel; Future    Hypertension, unspecified type  Stable and sees nephrology as directed  Orders:    Comprehensive metabolic panel; Future    CKD (chronic kidney disease) stage 3, GFR 30-59 ml/min ([facility])  Lab Results   Component Value Date    EGFR 63 11/30/2022    EGFR 3 (L) 11/02/2020    EGFR 4 (L) 03/11/2020    CREATININE 1.41 (H) 11/30/2022    CREATININE 18.80 (HH) 11/02/2020    CREATININE 14.60 (HH) 03/11/2020     Stable and rec staying well hydrated and also avoid NSAIDs  Orders:    omeprazole (PriLOSEC) 20 mg delayed release capsule; Take 1 capsule (20 mg total) by mouth daily    Comprehensive metabolic panel; Future    Insomnia, unspecified type  Stable and refilled trazodone  Orders:    traZODone (DESYREL) 100 mg tablet; take 1 AND 1/2 tablets by mouth at bedtime if needed for sleep    Gastroesophageal reflux disease, unspecified whether esophagitis present  Stable and on med prn  Orders:    Ambulatory Referral to Gastroenterology; Future    Screening for colon cancer  Get colon screening  Orders:    Ambulatory Referral to Gastroenterology; Future    Screening for hyperlipidemia  Check labs  Orders:    Lipid Panel with Direct LDL reflex; Future    Comprehensive metabolic panel; Future            Immunizations:  - Immunizations due:  Prevnar 20, Tdap and Zoster (Shingrix)      Depression Screening and Follow-up Plan: Patient was screened for depression during today's encounter. They screened negative with a PHQ-2 score of 0.          History of Present Illness     Adult Annual Physical:  Patient presents for annual physical. Here for general PE and is  and has 1 daughter in [place]. Patient is in maintenance. Non smoker and drinks alcohol rarely. Avoids processed foods. Patient does no formal exercise. Patient only gets 4-6 hours sleep per night. Sees dermatology and nephrology as directed. .     Diet and Physical Activity:  - Diet/Nutrition: no special diet.  - Exercise: no formal exercise.    Depression Screening:  - PHQ-2 Score: 0    General Health:  - Sleep: sleeps well, 4-6 hours of sleep on average and sleeps poorly.  - Hearing: normal hearing right ear and normal hearing left ear.  - Vision: no vision problems.  - Dental: regular dental visits and brushes teeth twice daily.     Health:  - History of STDs: no.   - Urinary symptoms: none.     Advanced Care Planning:  - Has an advanced directive?: no    - Has a durable medical POA?: no      Review of Systems   Constitutional: Negative.    HENT: Negative.     Eyes: Negative.    Respiratory: Negative.     Cardiovascular: Negative.    Gastrointestinal: Negative.    Endocrine: Negative.    Genitourinary: Negative.    Musculoskeletal: Negative.    Skin: Negative.    Allergic/Immunologic: Negative.    Neurological: Negative.    Hematological: Negative.    Psychiatric/Behavioral: Negative.       Medications Ordered Prior to Encounter[1]     Objective   /70   Temp 98.1 °F (36.7 °C)   Ht 6' (1.829 m)   Wt 83.6 kg (184 lb 3.2 oz)   SpO2 98%   BMI 24.98 kg/m²     Physical Exam  Constitutional:       Appearance: He is well-developed.   HENT:      Head: Normocephalic and atraumatic.      Right Ear: External ear normal.      Left Ear: External ear normal.      Nose: Nose normal.       Mouth/Throat:      Mouth: Mucous membranes are moist.     Eyes:      Conjunctiva/sclera: Conjunctivae normal.      Pupils: Pupils are equal, round, and reactive to light.       Cardiovascular:      Rate and Rhythm: Normal rate and regular rhythm.      Pulses: Normal pulses.      Heart sounds: Normal heart sounds.   Pulmonary:      Effort: Pulmonary effort is normal.      Breath sounds: Normal breath sounds.   Abdominal:      General: Abdomen is flat.   Genitourinary:     Penis: Normal.       Testes: Normal.     Musculoskeletal:         General: Normal range of motion.      Cervical back: Normal range of motion and neck supple.     Skin:     General: Skin is warm and dry.      Capillary Refill: Capillary refill takes less than 2 seconds.     Neurological:      General: No focal deficit present.      Mental Status: He is alert and oriented to person, place, and time. Mental status is at baseline.      Deep Tendon Reflexes: Reflexes are normal and symmetric.     Psychiatric:         Mood and Affect: Mood normal.         Behavior: Behavior normal.         Thought Content: Thought content normal.         Judgment: Judgment normal.       Administrative Statements   I have spent a total time of 34 minutes in caring for this patient on the day of the visit/encounter including Diagnostic results, Prognosis, Risks and benefits of tx options, Instructions for management, Patient and family education, Importance of tx compliance, Risk factor reductions, Impressions, Counseling / Coordination of care, Documenting in the medical record, Reviewing/placing orders in the medical record (including tests, medications, and/or procedures), and Obtaining or reviewing history  .       [1]   Current Outpatient Medications on File Prior to Visit   Medication Sig Dispense Refill    acetaminophen (TYLENOL) 325 mg tablet Take 650 mg by mouth      Envarsus XR 1 MG TB24       ergocalciferol (VITAMIN D2) 50,000 units take 1 capsule by mouth EVERY  30 DAYS      magnesium chloride-calcium (SLOW-MAG) 71.5-119 mg Take 143 mg by mouth 2 (two) times a day      mycophenolate (CELLCEPT) 250 mg capsule       predniSONE 5 mg tablet Take 5 mg by mouth daily      sodium bicarbonate 650 mg tablet Take 1,300 mg by mouth Three times a day      Veltassa 8.4 g PACK       [DISCONTINUED] omeprazole (PriLOSEC) 20 mg delayed release capsule Take 1 capsule (20 mg total) by mouth daily 90 capsule 1    [DISCONTINUED] traZODone (DESYREL) 100 mg tablet take 1 AND 1/2 tablets by mouth at bedtime if needed for sleep 135 tablet 1    labetalol (NORMODYNE) 300 mg tablet take 1 tablet by mouth three times a day (Patient taking differently: Take 300 mg by mouth 2 (two) times a day) 90 tablet 3    olmesartan (BENICAR) 40 mg tablet Take 40 mg by mouth every evening      penicillin V potassium (VEETID) 500 mg tablet Take 500 mg by mouth 2 (two) times a day      spironolactone (ALDACTONE) 25 mg tablet Take 25 mg by mouth daily      [DISCONTINUED] Sodium Zirconium Cyclosilicate 5 g PACK Take 5 g by mouth       No current facility-administered medications on file prior to visit.

## 2025-06-11 RX ORDER — TRAZODONE HYDROCHLORIDE 100 MG/1
TABLET ORAL
Qty: 135 TABLET | Refills: 0 | OUTPATIENT
Start: 2025-06-11

## 2025-06-12 LAB
ALBUMIN SERPL-MCNC: 4.2 G/DL (ref 3.6–5.1)
ALBUMIN/GLOB SERPL: 2.1 (CALC) (ref 1–2.5)
ALP SERPL-CCNC: 44 U/L (ref 36–130)
ALT SERPL-CCNC: 8 U/L (ref 9–46)
AST SERPL-CCNC: 10 U/L (ref 10–40)
BILIRUB SERPL-MCNC: 0.8 MG/DL (ref 0.2–1.2)
BUN SERPL-MCNC: 38 MG/DL (ref 7–25)
BUN/CREAT SERPL: 21 (CALC) (ref 6–22)
CALCIUM SERPL-MCNC: 10.6 MG/DL (ref 8.6–10.3)
CHLORIDE SERPL-SCNC: 112 MMOL/L (ref 98–110)
CHOLEST SERPL-MCNC: 240 MG/DL
CHOLEST/HDLC SERPL: 4.8 (CALC)
CO2 SERPL-SCNC: 21 MMOL/L (ref 20–32)
CREAT SERPL-MCNC: 1.77 MG/DL (ref 0.6–1.29)
GFR/BSA.PRED SERPLBLD CYS-BASED-ARV: 47 ML/MIN/1.73M2
GLOBULIN SER CALC-MCNC: 2 G/DL (CALC) (ref 1.9–3.7)
GLUCOSE SERPL-MCNC: 98 MG/DL (ref 65–99)
HDLC SERPL-MCNC: 50 MG/DL
LDLC SERPL CALC-MCNC: 154 MG/DL (CALC)
NONHDLC SERPL-MCNC: 190 MG/DL (CALC)
POTASSIUM SERPL-SCNC: 5.3 MMOL/L (ref 3.5–5.3)
PROT SERPL-MCNC: 6.2 G/DL (ref 6.1–8.1)
SODIUM SERPL-SCNC: 139 MMOL/L (ref 135–146)
TRIGL SERPL-MCNC: 198 MG/DL

## 2025-06-14 ENCOUNTER — RESULTS FOLLOW-UP (OUTPATIENT)
Dept: FAMILY MEDICINE CLINIC | Facility: CLINIC | Age: 45
End: 2025-06-14

## 2025-07-09 ENCOUNTER — OFFICE VISIT (OUTPATIENT)
Dept: FAMILY MEDICINE CLINIC | Facility: CLINIC | Age: 45
End: 2025-07-09
Payer: COMMERCIAL

## 2025-07-09 VITALS
SYSTOLIC BLOOD PRESSURE: 124 MMHG | WEIGHT: 187 LBS | HEIGHT: 72 IN | HEART RATE: 80 BPM | BODY MASS INDEX: 25.33 KG/M2 | OXYGEN SATURATION: 98 % | DIASTOLIC BLOOD PRESSURE: 82 MMHG | TEMPERATURE: 98.7 F

## 2025-07-09 DIAGNOSIS — E66.3 OVERWEIGHT (BMI 25.0-29.9): ICD-10-CM

## 2025-07-09 DIAGNOSIS — H60.501 ACUTE OTITIS EXTERNA OF RIGHT EAR, UNSPECIFIED TYPE: Primary | ICD-10-CM

## 2025-07-09 DIAGNOSIS — N18.6 END-STAGE RENAL DISEASE (HCC): ICD-10-CM

## 2025-07-09 PROCEDURE — 99214 OFFICE O/P EST MOD 30 MIN: CPT | Performed by: FAMILY MEDICINE

## 2025-07-09 RX ORDER — OFLOXACIN 3 MG/ML
5 SOLUTION AURICULAR (OTIC) DAILY
Qty: 1.75 ML | Refills: 0 | Status: SHIPPED | OUTPATIENT
Start: 2025-07-09 | End: 2025-07-16

## 2025-07-09 NOTE — ASSESSMENT & PLAN NOTE
Lab Results   Component Value Date    EGFR 47 (L) 06/12/2025    EGFR 63 11/30/2022    EGFR 3 (L) 11/02/2020    CREATININE 1.77 (H) 06/12/2025    CREATININE 1.41 (H) 11/30/2022    CREATININE 18.80 (HH) 11/02/2020     Stable and avoid nsaids and stay well hydrated

## 2025-07-09 NOTE — PROGRESS NOTES
Name: Charly Keene      : 1980      MRN: [id number]  Encounter Provider: Stefano Sprague DO  Encounter Date: 2025   Encounter department: [facility]  Chief Complaint   Patient presents with   • Pain     Ear pain. Had some bleeding as well.       Patient Instructions   Start Floxin otic and rec if any eustachian tube dysfunction rec Mucinex D and also Flonase prn.     Assessment & Plan  Acute otitis externa of right ear, unspecified type  Start floxin otic drops.   Orders:  •  ofloxacin (FLOXIN) 0.3 % otic solution; Administer 5 drops to the right ear daily for 7 days    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 (HCC)  Lab Results   Component Value Date    EGFR 47 (L) 2025    EGFR 63 2022    EGFR 3 (L) 2020    CREATININE 1.77 (H) 2025    CREATININE 1.41 (H) 2022    CREATININE 18.80 (HH) 2020     Stable and avoid nsaids and stay well hydrated       Overweight (BMI 25.0-29.9)  Lose weight to get BMI lower than 25              History of Present Illness     Pain (Ear pain. Had some bleeding as well.  )  This past  when using a q-tip. Swimming in [place].     Earache   There is pain in the right ear. This is a recurrent problem. The current episode started in the past 7 days. The problem occurs constantly. The problem has been gradually worsening. There has been no fever. The pain is at a severity of 6/10. Pertinent negatives include no abdominal pain, coughing, diarrhea, ear discharge, headaches, hearing loss, neck pain, rash, rhinorrhea, sore throat or vomiting.     Review of Systems   Constitutional: Negative.    HENT:  Positive for ear pain (right ear pain). Negative for ear discharge, hearing loss, rhinorrhea and sore throat.    Eyes: Negative.    Respiratory: Negative.  Negative for cough.    Cardiovascular: Negative.    Gastrointestinal: Negative.  Negative for abdominal pain, diarrhea and vomiting.   Endocrine: Negative.    Genitourinary:  "Negative.    Musculoskeletal: Negative.  Negative for neck pain.   Skin: Negative.  Negative for rash.   Allergic/Immunologic: Negative.    Neurological: Negative.  Negative for headaches.   Hematological: Negative.    Psychiatric/Behavioral: Negative.       Past Medical History[1]  Past Surgical History[2]  Family History[3]  Social History[4]  Medications[5]  Allergies   Allergen Reactions   • Prednisone Other (See Comments) and Swelling     Dizzy, bloated, shakey  Edema with high doses. Reports as an intolerance       Immunization History   Administered Date(s) Administered   • COVID-19 PFIZER VACCINE 0.3 ML IM 08/27/2021, 09/17/2021   • Hep B, adult 06/20/2018, 07/18/2018, 08/15/2018, 12/19/2018, 05/15/2019, 06/19/2019, 07/17/2019, 11/20/2019, 12/11/2019   • Influenza Quadrivalent Preservative Free 3 years and older IM 09/24/2021   • Meningococcal ACWY, unspecified 02/06/2017   • Meningococcal B, Recombinant (TRUMENBA) 03/22/2017, 01/19/2023, 04/19/2023, 07/19/2023   • Meningococcal MCV4, Unspecified 02/06/2017, 01/19/2023   • Meningococcal MCV4P 02/06/2017   • Meningococcal Polysaccharide (MPSV4) 01/19/2023   • Meningococcal, Unknown Serogroups 02/06/2017   • Pneumococcal Conjugate 13-Valent 05/23/2018   • Pneumococcal Polysaccharide PPV23 07/25/2018   • Tdap 08/14/2013   • meningococcal ACYW-135 TT Conjugate 01/19/2023     Objective   /82   Pulse 80   Temp 98.7 °F (37.1 °C) (Tympanic)   Ht 6' 0.01\" (1.829 m)   Wt 84.8 kg (187 lb)   SpO2 98%   BMI 25.36 kg/m²     Physical Exam  Constitutional:       Appearance: He is well-developed.   HENT:      Head: Normocephalic and atraumatic.      Comments: Right otitis externa resolving     Right Ear: External ear normal.      Left Ear: Tympanic membrane, ear canal and external ear normal.      Nose: Nose normal.      Mouth/Throat:      Mouth: Mucous membranes are moist.     Eyes:      Conjunctiva/sclera: Conjunctivae normal.      Pupils: Pupils are equal, " round, and reactive to light.       Cardiovascular:      Rate and Rhythm: Normal rate and regular rhythm.      Pulses: Normal pulses.      Heart sounds: Normal heart sounds.   Pulmonary:      Effort: Pulmonary effort is normal.      Breath sounds: Normal breath sounds.     Musculoskeletal:         General: Normal range of motion.      Cervical back: Normal range of motion and neck supple.     Skin:     General: Skin is warm and dry.     Neurological:      General: No focal deficit present.      Mental Status: He is alert and oriented to person, place, and time. Mental status is at baseline.      Deep Tendon Reflexes: Reflexes are normal and symmetric.     Psychiatric:         Mood and Affect: Mood normal.         Behavior: Behavior normal.         Thought Content: Thought content normal.         Judgment: Judgment normal.       Administrative Statements   I have spent a total time of 30 minutes in caring for this patient on the day of the visit/encounter including Diagnostic results, Prognosis, Risks and benefits of tx options, Instructions for management, Patient and family education, Importance of tx compliance, Risk factor reductions, Impressions, Counseling / Coordination of care, Documenting in the medical record, Reviewing/placing orders in the medical record (including tests, medications, and/or procedures), and Obtaining or reviewing history  .         [1]  Past Medical History:  Diagnosis Date   • Chronic kidney disease    • Hypertension 2000   • Kidney failure 2008   [2]  Past Surgical History:  Procedure Laterality Date   • DIALYSIS FISTULA CREATION  2020   • NEPHRECTOMY TRANSPLANTED ORGAN  06/2009   • NEPHRECTOMY TRANSPLANTED ORGAN  03/2022   • US GUIDED KIDNEY BIOPSY  1/3/2023   • US GUIDED KIDNEY BIOPSY  12/26/2017   • US GUIDED KIDNEY BIOPSY  2/1/2017   [3]  Family History  Problem Relation Name Age of Onset   • Breast cancer Mother     • Heart disease Father     • Heart disease Paternal Grandmother      • Throat cancer Paternal Grandfather     • Lung cancer Maternal Aunt     [4]  Social History  Tobacco Use   • Smoking status: Never   • Smokeless tobacco: Former   Vaping Use   • Vaping status: Never Used   Substance and Sexual Activity   • Alcohol use: Not Currently     Comment: socially    • Drug use: Never   • Sexual activity: Yes     Partners: Female   [5]  Current Outpatient Medications on File Prior to Visit   Medication Sig   • acetaminophen (TYLENOL) 325 mg tablet Take 650 mg by mouth   • Envarsus XR 1 MG TB24    • ergocalciferol (VITAMIN D2) 50,000 units    • labetalol (NORMODYNE) 300 mg tablet take 1 tablet by mouth three times a day   • magnesium chloride-calcium (SLOW-MAG) 71.5-119 mg Take 143 mg by mouth in the morning and 143 mg in the evening.   • mycophenolate (CELLCEPT) 250 mg capsule    • olmesartan (BENICAR) 40 mg tablet Take 40 mg by mouth every evening   • omeprazole (PriLOSEC) 20 mg delayed release capsule Take 1 capsule (20 mg total) by mouth daily   • penicillin V potassium (VEETID) 500 mg tablet Take 500 mg by mouth in the morning and 500 mg in the evening.   • predniSONE 5 mg tablet Take 5 mg by mouth in the morning.   • sodium bicarbonate 650 mg tablet Take 1,300 mg by mouth in the morning and 1,300 mg at noon and 1,300 mg in the evening.   • spironolactone (ALDACTONE) 25 mg tablet Take 25 mg by mouth in the morning.   • traZODone (DESYREL) 100 mg tablet take 1 AND 1/2 tablets by mouth at bedtime if needed for sleep   • Veltassa 8.4 g PACK